# Patient Record
Sex: FEMALE | Race: BLACK OR AFRICAN AMERICAN | NOT HISPANIC OR LATINO | URBAN - METROPOLITAN AREA
[De-identification: names, ages, dates, MRNs, and addresses within clinical notes are randomized per-mention and may not be internally consistent; named-entity substitution may affect disease eponyms.]

---

## 2022-03-02 ENCOUNTER — EMERGENCY (EMERGENCY)
Facility: HOSPITAL | Age: 32
LOS: 1 days | Discharge: HOME | End: 2022-03-02
Attending: EMERGENCY MEDICINE | Admitting: EMERGENCY MEDICINE
Payer: MEDICAID

## 2022-03-02 VITALS
HEART RATE: 68 BPM | TEMPERATURE: 98 F | RESPIRATION RATE: 18 BRPM | DIASTOLIC BLOOD PRESSURE: 88 MMHG | OXYGEN SATURATION: 98 % | SYSTOLIC BLOOD PRESSURE: 141 MMHG

## 2022-03-02 VITALS
RESPIRATION RATE: 18 BRPM | TEMPERATURE: 98 F | HEART RATE: 70 BPM | OXYGEN SATURATION: 98 % | SYSTOLIC BLOOD PRESSURE: 138 MMHG | DIASTOLIC BLOOD PRESSURE: 93 MMHG

## 2022-03-02 LAB
ALBUMIN SERPL ELPH-MCNC: 4 G/DL — SIGNIFICANT CHANGE UP (ref 3.3–5)
ALP SERPL-CCNC: 78 U/L — SIGNIFICANT CHANGE UP (ref 40–120)
ALT FLD-CCNC: 15 U/L — SIGNIFICANT CHANGE UP (ref 4–33)
ANION GAP SERPL CALC-SCNC: 11 MMOL/L — SIGNIFICANT CHANGE UP (ref 7–14)
APPEARANCE UR: CLEAR — SIGNIFICANT CHANGE UP
AST SERPL-CCNC: 16 U/L — SIGNIFICANT CHANGE UP (ref 4–32)
BASOPHILS # BLD AUTO: 0.05 K/UL — SIGNIFICANT CHANGE UP (ref 0–0.2)
BASOPHILS NFR BLD AUTO: 0.6 % — SIGNIFICANT CHANGE UP (ref 0–2)
BILIRUB SERPL-MCNC: 0.2 MG/DL — SIGNIFICANT CHANGE UP (ref 0.2–1.2)
BILIRUB UR-MCNC: NEGATIVE — SIGNIFICANT CHANGE UP
BUN SERPL-MCNC: 11 MG/DL — SIGNIFICANT CHANGE UP (ref 7–23)
CALCIUM SERPL-MCNC: 9 MG/DL — SIGNIFICANT CHANGE UP (ref 8.4–10.5)
CHLORIDE SERPL-SCNC: 104 MMOL/L — SIGNIFICANT CHANGE UP (ref 98–107)
CO2 SERPL-SCNC: 22 MMOL/L — SIGNIFICANT CHANGE UP (ref 22–31)
COLOR SPEC: YELLOW — SIGNIFICANT CHANGE UP
CREAT SERPL-MCNC: 0.79 MG/DL — SIGNIFICANT CHANGE UP (ref 0.5–1.3)
DIFF PNL FLD: NEGATIVE — SIGNIFICANT CHANGE UP
EGFR: 101 ML/MIN/1.73M2 — SIGNIFICANT CHANGE UP
EOSINOPHIL # BLD AUTO: 0.31 K/UL — SIGNIFICANT CHANGE UP (ref 0–0.5)
EOSINOPHIL NFR BLD AUTO: 3.5 % — SIGNIFICANT CHANGE UP (ref 0–6)
GLUCOSE SERPL-MCNC: 99 MG/DL — SIGNIFICANT CHANGE UP (ref 70–99)
GLUCOSE UR QL: NEGATIVE — SIGNIFICANT CHANGE UP
HCG SERPL-ACNC: <5 MIU/ML — SIGNIFICANT CHANGE UP
HCT VFR BLD CALC: 33.6 % — LOW (ref 34.5–45)
HGB BLD-MCNC: 10.5 G/DL — LOW (ref 11.5–15.5)
IANC: 5.46 K/UL — SIGNIFICANT CHANGE UP (ref 1.5–8.5)
IMM GRANULOCYTES NFR BLD AUTO: 0.5 % — SIGNIFICANT CHANGE UP (ref 0–1.5)
KETONES UR-MCNC: NEGATIVE — SIGNIFICANT CHANGE UP
LEUKOCYTE ESTERASE UR-ACNC: NEGATIVE — SIGNIFICANT CHANGE UP
LYMPHOCYTES # BLD AUTO: 2.12 K/UL — SIGNIFICANT CHANGE UP (ref 1–3.3)
LYMPHOCYTES # BLD AUTO: 24.2 % — SIGNIFICANT CHANGE UP (ref 13–44)
MCHC RBC-ENTMCNC: 23.9 PG — LOW (ref 27–34)
MCHC RBC-ENTMCNC: 31.3 GM/DL — LOW (ref 32–36)
MCV RBC AUTO: 76.4 FL — LOW (ref 80–100)
MONOCYTES # BLD AUTO: 0.79 K/UL — SIGNIFICANT CHANGE UP (ref 0–0.9)
MONOCYTES NFR BLD AUTO: 9 % — SIGNIFICANT CHANGE UP (ref 2–14)
NEUTROPHILS # BLD AUTO: 5.46 K/UL — SIGNIFICANT CHANGE UP (ref 1.8–7.4)
NEUTROPHILS NFR BLD AUTO: 62.2 % — SIGNIFICANT CHANGE UP (ref 43–77)
NITRITE UR-MCNC: POSITIVE
NRBC # BLD: 0 /100 WBCS — SIGNIFICANT CHANGE UP
NRBC # FLD: 0 K/UL — SIGNIFICANT CHANGE UP
PH UR: 5.5 — SIGNIFICANT CHANGE UP (ref 5–8)
PLATELET # BLD AUTO: 314 K/UL — SIGNIFICANT CHANGE UP (ref 150–400)
POTASSIUM SERPL-MCNC: 4.2 MMOL/L — SIGNIFICANT CHANGE UP (ref 3.5–5.3)
POTASSIUM SERPL-SCNC: 4.2 MMOL/L — SIGNIFICANT CHANGE UP (ref 3.5–5.3)
PROT SERPL-MCNC: 7.8 G/DL — SIGNIFICANT CHANGE UP (ref 6–8.3)
PROT UR-MCNC: ABNORMAL
RBC # BLD: 4.4 M/UL — SIGNIFICANT CHANGE UP (ref 3.8–5.2)
RBC # FLD: 17.1 % — HIGH (ref 10.3–14.5)
SODIUM SERPL-SCNC: 137 MMOL/L — SIGNIFICANT CHANGE UP (ref 135–145)
SP GR SPEC: 1.03 — SIGNIFICANT CHANGE UP (ref 1–1.05)
UROBILINOGEN FLD QL: SIGNIFICANT CHANGE UP
WBC # BLD: 8.77 K/UL — SIGNIFICANT CHANGE UP (ref 3.8–10.5)
WBC # FLD AUTO: 8.77 K/UL — SIGNIFICANT CHANGE UP (ref 3.8–10.5)

## 2022-03-02 PROCEDURE — 76830 TRANSVAGINAL US NON-OB: CPT | Mod: 26

## 2022-03-02 PROCEDURE — 99285 EMERGENCY DEPT VISIT HI MDM: CPT

## 2022-03-02 RX ORDER — KETOROLAC TROMETHAMINE 30 MG/ML
30 SYRINGE (ML) INJECTION ONCE
Refills: 0 | Status: DISCONTINUED | OUTPATIENT
Start: 2022-03-02 | End: 2022-03-02

## 2022-03-02 RX ORDER — SODIUM CHLORIDE 9 MG/ML
1000 INJECTION INTRAMUSCULAR; INTRAVENOUS; SUBCUTANEOUS ONCE
Refills: 0 | Status: COMPLETED | OUTPATIENT
Start: 2022-03-02 | End: 2022-03-02

## 2022-03-02 RX ORDER — CEPHALEXIN 500 MG
1 CAPSULE ORAL
Qty: 5 | Refills: 0
Start: 2022-03-02

## 2022-03-02 RX ADMIN — SODIUM CHLORIDE 1000 MILLILITER(S): 9 INJECTION INTRAMUSCULAR; INTRAVENOUS; SUBCUTANEOUS at 10:47

## 2022-03-02 RX ADMIN — Medication 30 MILLIGRAM(S): at 10:47

## 2022-03-02 NOTE — ED PROVIDER NOTE - OBJECTIVE STATEMENT
31 yo F with Hx of fibroids here with sharp lower abdominal pain that she woke up this morning a/w a few hours of dysuria. Denies f/c, sob, cp, vomiting, hematuria, vaginal bleeding, vaginal discharge. LMP 2/12. No hx of abdominal surgeries.

## 2022-03-02 NOTE — ED ADULT NURSE NOTE - OBJECTIVE STATEMENT
Patient is a 31 yo female, denies PMH, presenting with lower abdominal pain starting at 4am. Patient AAOx4, no signs of distress, denies nausea, vomiting, fever, chills, shortness of breath, chest pain. 20G PIV placed to LAC, labs sent per orders. Medicated for pain. Fall precautions maintained.

## 2022-03-02 NOTE — ED ADULT NURSE NOTE - NSIMPLEMENTINTERV_GEN_ALL_ED
Implemented All Universal Safety Interventions:  [place] to call system. Call bell, personal items and telephone within reach. Instruct patient to call for assistance. Room bathroom lighting operational. Non-slip footwear when patient is off stretcher. Physically safe environment: no spills, clutter or unnecessary equipment. Stretcher in lowest position, wheels locked, appropriate side rails in place.

## 2022-03-02 NOTE — ED PROVIDER NOTE - PATIENT PORTAL LINK FT
You can access the FollowMyHealth Patient Portal offered by [facility] by registering at the following website: [email or web address]. By joining [employer]’s FollowMyHealth portal, you will also be able to view your health information using other applications (apps) compatible with our system.

## 2022-03-02 NOTE — ED PROVIDER NOTE - CLINICAL SUMMARY MEDICAL DECISION MAKING FREE TEXT BOX
Based on tenderness in the suprapubic region with dysuria, likely UTI so will check labs and ua, uc/s but if neg for infection will performed transvaginal sono, reassess.

## 2022-03-02 NOTE — ED PROVIDER NOTE - NS ED ATTENDING STATEMENT MOD
Attending Only Griseofulvin Pregnancy And Lactation Text: This medication is Pregnancy Category X and is known to cause serious birth defects. It is unknown if this medication is excreted in breast milk but breast feeding should be avoided.

## 2022-03-02 NOTE — ED PROVIDER NOTE - NSFOLLOWUPINSTRUCTIONS_ED_ALL_ED_FT
Advance activity as tolerated.  Continue all previously prescribed medications as directed unless otherwise instructed.  Follow up with your primary care physician in 48-72 hours- bring copies of your results.  Return to the ER for worsening or persistent symptoms, and/or ANY NEW OR CONCERNING SYMPTOMS. If you have issues obtaining follow up, please call: [phone] to obtain a doctor or specialist who takes your insurance in your area.  You may call [phone] to make an appointment with the internal medicine clinic.

## 2022-05-12 ENCOUNTER — EMERGENCY (EMERGENCY)
Facility: HOSPITAL | Age: 32
LOS: 1 days | Discharge: HOME | End: 2022-05-12
Admitting: EMERGENCY MEDICINE
Payer: MEDICAID

## 2022-05-12 VITALS
HEART RATE: 74 BPM | OXYGEN SATURATION: 100 % | RESPIRATION RATE: 16 BRPM | SYSTOLIC BLOOD PRESSURE: 147 MMHG | HEIGHT: 70 IN | DIASTOLIC BLOOD PRESSURE: 89 MMHG | TEMPERATURE: 98 F

## 2022-05-12 LAB
APPEARANCE UR: ABNORMAL
BACTERIA # UR AUTO: NEGATIVE — SIGNIFICANT CHANGE UP
BILIRUB UR-MCNC: NEGATIVE — SIGNIFICANT CHANGE UP
COLOR SPEC: YELLOW — SIGNIFICANT CHANGE UP
DIFF PNL FLD: NEGATIVE — SIGNIFICANT CHANGE UP
EPI CELLS # UR: 5 /HPF — SIGNIFICANT CHANGE UP (ref 0–5)
GLUCOSE UR QL: NEGATIVE — SIGNIFICANT CHANGE UP
HYALINE CASTS # UR AUTO: 1 /LPF — SIGNIFICANT CHANGE UP (ref 0–7)
KETONES UR-MCNC: NEGATIVE — SIGNIFICANT CHANGE UP
LEUKOCYTE ESTERASE UR-ACNC: NEGATIVE — SIGNIFICANT CHANGE UP
NITRITE UR-MCNC: NEGATIVE — SIGNIFICANT CHANGE UP
PH UR: 6 — SIGNIFICANT CHANGE UP (ref 5–8)
PROT UR-MCNC: ABNORMAL
RBC CASTS # UR COMP ASSIST: 1 /HPF — SIGNIFICANT CHANGE UP (ref 0–4)
SP GR SPEC: 1.04 — SIGNIFICANT CHANGE UP (ref 1–1.05)
UROBILINOGEN FLD QL: SIGNIFICANT CHANGE UP
WBC UR QL: 1 /HPF — SIGNIFICANT CHANGE UP (ref 0–5)

## 2022-05-12 PROCEDURE — 99284 EMERGENCY DEPT VISIT MOD MDM: CPT

## 2022-05-12 RX ORDER — CEPHALEXIN 500 MG
1 CAPSULE ORAL
Qty: 28 | Refills: 0
Start: 2022-05-12 | End: 2022-05-18

## 2022-05-12 NOTE — ED PROVIDER NOTE - OBJECTIVE STATEMENT
33 yo F with PMH of fibroids, right ovarian cyst, presents to ER c/o pelvic pain a/w dysuria today. Reports heaviness pain, urinary urgency & hesitancy. Admits similar UTI symptoms 2 months ago. Denies any fever, chills, n/v/d/c, vaginal bleeding, vaginal discharge, hematuria, hx of kidney stones, or any other complaints. 33 yo F with PMH of fibroids, right ovarian cyst, presents to ER c/o suprapubic pain a/w dysuria today. Reports heaviness bladder pain, urinary urgency & hesitancy. Admits similar UTI symptoms 2 months ago. Denies any fever, chills, n/v/d/c, vaginal bleeding, vaginal discharge, hematuria, hx of kidney stones, or any other complaints.

## 2022-05-12 NOTE — ED PROVIDER NOTE - PATIENT PORTAL LINK FT
You can access the FollowMyHealth Patient Portal offered by [facility] by registering at the following website: [email or web address]. By joining [employer]’s FollowMyHealth portal, you will also be able to view your health information using other applications (apps) compatible with our system.

## 2022-05-12 NOTE — ED PROVIDER NOTE - CLINICAL SUMMARY MEDICAL DECISION MAKING FREE TEXT BOX
33 yo F with PMH of fibroids, right ovarian cyst, presents to ER c/o pelvic pain a/w dysuria today. well appearing female. there is no fever. Likely UTI. less likely intra-uterine pathology. Plan: Ua, Ucx, G/chlamydia test and reassess. 33 yo F with PMH of fibroids, right ovarian cyst, presents to ER c/o suprapubic pain a/w dysuria today. well appearing female. there is no fever. Likely UTI. less likely intra-uterine pathology. Plan: Ua, Ucx, G/chlamydia test and reassess.

## 2022-05-12 NOTE — ED PROVIDER NOTE - NSFOLLOWUPINSTRUCTIONS_ED_ALL_ED_FT
Follow up with your PMD within 48-72 hours.  Take Keflex 500 mg 4 times a day for 7 days -- finish this antibiotic.  Increase fluids. Motrin 400mg every 6-8 hours with food for pain. Worsening pain, new fever, chills, nausea, vomiting OR ANY NEW CONCERNING SYMPTOMS return to the Emergency department.

## 2022-05-12 NOTE — ED PROVIDER NOTE - PROGRESS NOTE DETAILS
PA STEFANO: Ua negative. discussed results with patient, pt states her symptoms improved, declined further evaluation with TVUS, agrees to antibiotic treatment and GYN f/u, strict return precautions. Pt is medically stable for discharge and follow up with PMD & GYN. The patient was given verbal and written discharge instructions. Specifically, instructions when to return to the ED and when to seek follow-up from their pcp was discussed. Any specialty follow-up was discussed, including how to make an appointment.  Instructions were discussed in simple, plain language and was understood by the patient. The patient understands that should their symptoms worsen or any new symptoms arise, they should return to the ED immediately for further evaluation. All pt's questions were answered. Patient verbalizes understanding.

## 2022-05-12 NOTE — ED ADULT TRIAGE NOTE - CHIEF COMPLAINT QUOTE
pt c/o pelvic pain with urinary frequency. states was treated for a UTI last month. states took 400mg of motrin pta. denies fever/chills. LMP 4/28

## 2022-05-13 LAB
C TRACH RRNA SPEC QL NAA+PROBE: SIGNIFICANT CHANGE UP
N GONORRHOEA RRNA SPEC QL NAA+PROBE: SIGNIFICANT CHANGE UP
SPECIMEN SOURCE: SIGNIFICANT CHANGE UP

## 2022-05-14 LAB
CULTURE RESULTS: SIGNIFICANT CHANGE UP
SPECIMEN SOURCE: SIGNIFICANT CHANGE UP

## 2022-06-23 ENCOUNTER — EMERGENCY (EMERGENCY)
Facility: HOSPITAL | Age: 32
LOS: 1 days | Discharge: HOME | End: 2022-06-23
Attending: EMERGENCY MEDICINE | Admitting: EMERGENCY MEDICINE
Payer: MEDICAID

## 2022-06-23 VITALS
HEIGHT: 70 IN | SYSTOLIC BLOOD PRESSURE: 143 MMHG | RESPIRATION RATE: 16 BRPM | OXYGEN SATURATION: 100 % | TEMPERATURE: 98 F | HEART RATE: 74 BPM | DIASTOLIC BLOOD PRESSURE: 82 MMHG

## 2022-06-23 PROBLEM — D21.9 BENIGN NEOPLASM OF CONNECTIVE AND OTHER SOFT TISSUE, UNSPECIFIED: Chronic | Status: ACTIVE | Noted: 2022-05-12

## 2022-06-23 PROBLEM — N83.209 UNSPECIFIED OVARIAN CYST, UNSPECIFIED SIDE: Chronic | Status: ACTIVE | Noted: 2022-05-12

## 2022-06-23 PROCEDURE — 99284 EMERGENCY DEPT VISIT MOD MDM: CPT

## 2022-06-23 RX ORDER — ACETAMINOPHEN 500 MG
650 TABLET ORAL ONCE
Refills: 0 | Status: COMPLETED | OUTPATIENT
Start: 2022-06-23 | End: 2022-06-23

## 2022-06-23 RX ADMIN — Medication 650 MILLIGRAM(S): at 11:16

## 2022-06-23 NOTE — ED ADULT TRIAGE NOTE - CHIEF COMPLAINT QUOTE
Pt c/o atraumatic left shoulder pain since Saturday.  Pt states that she went to Urgent Care on Tuesday and was given Naproxen.  Pt states that she was unable to tolerate the medication, still having pain

## 2022-06-23 NOTE — ED PROVIDER NOTE - PROGRESS NOTE DETAILS
Dr. Mcgovern: pt notified that her urine pregnancy test was positive, she is very happy, as she has been trying to get pregnant; pt on prenatal vitamins that I advised her to continue; no abdominal pain or vaginal bleeding to warrant TVUS or further OB workup; LMP 4 weeks ago, and I advised pt to call her OBGYN to schedule a first trimester visit; discussed that in light of pregnancy, acetaminophen is pt's best option for pain control at this time

## 2022-06-23 NOTE — ED PROVIDER NOTE - PATIENT PORTAL LINK FT
You can access the FollowMyHealth Patient Portal offered by [facility] by registering at the following website: [email or web address]. By joining [employer]’s FollowMyHealth portal, you will also be able to view your health information using other applications (apps) compatible with our system.

## 2022-06-23 NOTE — ED PROVIDER NOTE - OBJECTIVE STATEMENT
Dr. Mcgovern: 31 yo right-hand dominant female with asthma (on albuterol PRN), in ED with 5-6 days of atraumatic left shoulder/arm pain.  Pt works as a dance teacher, is very active, but does not remember any falls/injuries/specific movements that preceded pain.  No numbness, tingling or focal weakness.  Pain begins in left shoulder and radiates into left upper arm, sometimes down into left 5th digit.  Pt went to urgent care a few days ago, had reportedly negative upper extremity xray, was discharged with naproxen.  Naproxen has not been helping pain and so pt came to ED.

## 2022-06-23 NOTE — ED PROVIDER NOTE - CLINICAL SUMMARY MEDICAL DECISION MAKING FREE TEXT BOX
left arm pain, atraumatic; no weakness or sensory deficits; exam with spasm and focal TTP, but distribution of symptoms is radicular; consider musculoskeletal pain vs. impingement at neck or brachial plexus; low suspicion for fracture or cord compression; no indication for ED imaging; will treat pain and refer to ortho if symptoms continue

## 2022-06-23 NOTE — ED PROVIDER NOTE - NSFOLLOWUPINSTRUCTIONS_ED_ALL_ED_FT
YOU WERE SEEN IN THE ED FOR LEFT ARM PAIN.  YOU EXAM DOES NOT SHOW SIGNS OF A FRACTURE.  IT IS POSSIBLE THAT YOUR PAIN IS RELATED TO MUSCLE SPASM OR A PINCHED NERVE.  BECAUSE YOU ARE PREGNANT (CONGRATULATIONS!), THE SAFEST OPTION AT THIS POINT IS TYLENOL.  YOU CAN TAKE TYLENOL AS DIRECTED ON THE BOTTLE.    1. YOU CAN ALSO USE ICE OR HEAT TO THE AREA AS DESIRED: APPLY FOR 10 MINUTES ON AND 10 MINUTES OFF, ALTERNATING AS NEEDED.  ALWAYS PUT A TOWEL BETWEEN YOUR SKIN AND THE ICE OR HEAT, TO PROTECT YOUR SKIN FROM FROSTBITE OR BURNS.    2. CALL YOUR OBGYN TO SCHEDULE A ROUTINE FIRST TRIMESTER VISIT.  CONTINUE TO TAKE YOUR PRENATAL VITAMINS.    3. RETURN TO THE ER FOR ANY WORSENING SYMPTOMS OR CONCERNS.

## 2022-06-23 NOTE — ED PROVIDER NOTE - PHYSICAL EXAMINATION
left upper extremity: normal appearing, no deformity; left neck paraspinal cervical muscles with spasm and associated TTP, as well as TTP along left upper extremity laterally; full ROM to entire left UE, including shoulder, elbow, wrist  sensation intact and equal bilateral upper extremities  strength 5/5 both arms and legs proximally and distally  Midline cervical/thoracic/lumbosacral spine without bony TTP or step off.

## 2022-06-29 ENCOUNTER — EMERGENCY (EMERGENCY)
Facility: HOSPITAL | Age: 32
LOS: 1 days | Discharge: HOME | End: 2022-06-29
Attending: EMERGENCY MEDICINE | Admitting: EMERGENCY MEDICINE

## 2022-06-29 VITALS
RESPIRATION RATE: 18 BRPM | OXYGEN SATURATION: 100 % | HEIGHT: 70 IN | HEART RATE: 84 BPM | SYSTOLIC BLOOD PRESSURE: 133 MMHG | DIASTOLIC BLOOD PRESSURE: 71 MMHG | TEMPERATURE: 98 F

## 2022-06-29 LAB
ALBUMIN SERPL ELPH-MCNC: 4.1 G/DL — SIGNIFICANT CHANGE UP (ref 3.3–5)
ALP SERPL-CCNC: 73 U/L — SIGNIFICANT CHANGE UP (ref 40–120)
ALT FLD-CCNC: 19 U/L — SIGNIFICANT CHANGE UP (ref 4–33)
ANION GAP SERPL CALC-SCNC: 13 MMOL/L — SIGNIFICANT CHANGE UP (ref 7–14)
ANISOCYTOSIS BLD QL: SLIGHT — SIGNIFICANT CHANGE UP
APPEARANCE UR: CLEAR — SIGNIFICANT CHANGE UP
AST SERPL-CCNC: 17 U/L — SIGNIFICANT CHANGE UP (ref 4–32)
BACTERIA # UR AUTO: ABNORMAL
BASOPHILS # BLD AUTO: 0 K/UL — SIGNIFICANT CHANGE UP (ref 0–0.2)
BASOPHILS NFR BLD AUTO: 0 % — SIGNIFICANT CHANGE UP (ref 0–2)
BILIRUB SERPL-MCNC: 0.3 MG/DL — SIGNIFICANT CHANGE UP (ref 0.2–1.2)
BILIRUB UR-MCNC: NEGATIVE — SIGNIFICANT CHANGE UP
BLD GP AB SCN SERPL QL: NEGATIVE — SIGNIFICANT CHANGE UP
BUN SERPL-MCNC: 14 MG/DL — SIGNIFICANT CHANGE UP (ref 7–23)
CALCIUM SERPL-MCNC: 9.7 MG/DL — SIGNIFICANT CHANGE UP (ref 8.4–10.5)
CHLORIDE SERPL-SCNC: 101 MMOL/L — SIGNIFICANT CHANGE UP (ref 98–107)
CO2 SERPL-SCNC: 23 MMOL/L — SIGNIFICANT CHANGE UP (ref 22–31)
COLOR SPEC: YELLOW — SIGNIFICANT CHANGE UP
CREAT SERPL-MCNC: 0.71 MG/DL — SIGNIFICANT CHANGE UP (ref 0.5–1.3)
DIFF PNL FLD: ABNORMAL
EGFR: 115 ML/MIN/1.73M2 — SIGNIFICANT CHANGE UP
EOSINOPHIL # BLD AUTO: 0.15 K/UL — SIGNIFICANT CHANGE UP (ref 0–0.5)
EOSINOPHIL NFR BLD AUTO: 0.9 % — SIGNIFICANT CHANGE UP (ref 0–6)
EPI CELLS # UR: 8 /HPF — HIGH (ref 0–5)
GIANT PLATELETS BLD QL SMEAR: PRESENT — SIGNIFICANT CHANGE UP
GLUCOSE SERPL-MCNC: 125 MG/DL — HIGH (ref 70–99)
GLUCOSE UR QL: NEGATIVE — SIGNIFICANT CHANGE UP
HCG SERPL-ACNC: 1308 MIU/ML — SIGNIFICANT CHANGE UP
HCT VFR BLD CALC: 35.6 % — SIGNIFICANT CHANGE UP (ref 34.5–45)
HGB BLD-MCNC: 11.1 G/DL — LOW (ref 11.5–15.5)
HYALINE CASTS # UR AUTO: 2 /LPF — SIGNIFICANT CHANGE UP (ref 0–7)
IANC: 10.29 K/UL — HIGH (ref 1.8–7.4)
KETONES UR-MCNC: NEGATIVE — SIGNIFICANT CHANGE UP
LEUKOCYTE ESTERASE UR-ACNC: ABNORMAL
LYMPHOCYTES # BLD AUTO: 22.8 % — SIGNIFICANT CHANGE UP (ref 13–44)
LYMPHOCYTES # BLD AUTO: 3.68 K/UL — HIGH (ref 1–3.3)
MACROCYTES BLD QL: SLIGHT — SIGNIFICANT CHANGE UP
MCHC RBC-ENTMCNC: 24.3 PG — LOW (ref 27–34)
MCHC RBC-ENTMCNC: 31.2 GM/DL — LOW (ref 32–36)
MCV RBC AUTO: 78.1 FL — LOW (ref 80–100)
MONOCYTES # BLD AUTO: 0.71 K/UL — SIGNIFICANT CHANGE UP (ref 0–0.9)
MONOCYTES NFR BLD AUTO: 4.4 % — SIGNIFICANT CHANGE UP (ref 2–14)
NEUTROPHILS # BLD AUTO: 11.33 K/UL — HIGH (ref 1.8–7.4)
NEUTROPHILS NFR BLD AUTO: 70.2 % — SIGNIFICANT CHANGE UP (ref 43–77)
NITRITE UR-MCNC: NEGATIVE — SIGNIFICANT CHANGE UP
PH UR: 6 — SIGNIFICANT CHANGE UP (ref 5–8)
PLAT MORPH BLD: NORMAL — SIGNIFICANT CHANGE UP
PLATELET # BLD AUTO: 309 K/UL — SIGNIFICANT CHANGE UP (ref 150–400)
PLATELET COUNT - ESTIMATE: NORMAL — SIGNIFICANT CHANGE UP
POTASSIUM SERPL-MCNC: 3.5 MMOL/L — SIGNIFICANT CHANGE UP (ref 3.5–5.3)
POTASSIUM SERPL-SCNC: 3.5 MMOL/L — SIGNIFICANT CHANGE UP (ref 3.5–5.3)
PROT SERPL-MCNC: 7.8 G/DL — SIGNIFICANT CHANGE UP (ref 6–8.3)
PROT UR-MCNC: ABNORMAL
RBC # BLD: 4.56 M/UL — SIGNIFICANT CHANGE UP (ref 3.8–5.2)
RBC # FLD: 20.6 % — HIGH (ref 10.3–14.5)
RBC BLD AUTO: ABNORMAL
RBC CASTS # UR COMP ASSIST: 7 /HPF — HIGH (ref 0–4)
RH IG SCN BLD-IMP: POSITIVE — SIGNIFICANT CHANGE UP
SMUDGE CELLS # BLD: PRESENT — SIGNIFICANT CHANGE UP
SODIUM SERPL-SCNC: 137 MMOL/L — SIGNIFICANT CHANGE UP (ref 135–145)
SP GR SPEC: 1.03 — SIGNIFICANT CHANGE UP (ref 1–1.05)
UROBILINOGEN FLD QL: SIGNIFICANT CHANGE UP
VARIANT LYMPHS # BLD: 1.7 % — SIGNIFICANT CHANGE UP (ref 0–6)
WBC # BLD: 16.14 K/UL — HIGH (ref 3.8–10.5)
WBC # FLD AUTO: 16.14 K/UL — HIGH (ref 3.8–10.5)
WBC UR QL: 7 /HPF — HIGH (ref 0–5)

## 2022-06-29 PROCEDURE — 99285 EMERGENCY DEPT VISIT HI MDM: CPT

## 2022-06-29 RX ORDER — ACETAMINOPHEN 500 MG
650 TABLET ORAL ONCE
Refills: 0 | Status: COMPLETED | OUTPATIENT
Start: 2022-06-29 | End: 2022-06-29

## 2022-06-29 RX ADMIN — Medication 650 MILLIGRAM(S): at 22:34

## 2022-06-29 NOTE — ED PROVIDER NOTE - CLINICAL SUMMARY MEDICAL DECISION MAKING FREE TEXT BOX
Luana Asencio MD, PGY-3: 31YO F hx asthma, p/w mechanical fall onto R buttock hours prior and abdominal cramping in setting of pregnancy. vss, pe no abd ttp, + mild TTP R buttock. consider ectopic pregnancy given pain. low suspicion fracture, cauda equina s/p mechanical fall given benign PE findings. plan for basic labs, hcg, ua, tvus.

## 2022-06-29 NOTE — ED PROVIDER NOTE - PATIENT PORTAL LINK FT
You can access the FollowMyHealth Patient Portal offered by [facility] by registering at the following website: [email or web address]. By joining [employer]’s FollowMyHealth portal, you will also be able to view your health information using other applications (apps) compatible with our system.

## 2022-06-29 NOTE — ED PROVIDER NOTE - OBJECTIVE STATEMENT
31YO F hx asthma, p/w mechanical fall onto R buttock hours prior. no LOC, no head/neck trauma or pain. denies back/flank pain, saddle anesthesia, b/b incontinence or retention. pt ambulatory s/p event w/o issues. also endorses multiple positive pregnancy tests 1d prior, LMP 5/24, a/w pelvic cramping (x 4 days) and increased urinary frequency. denies dysuria, vaginal discharge or bleeding.

## 2022-06-29 NOTE — ED PROVIDER NOTE - ATTENDING CONTRIBUTION TO CARE
I performed a face-to-face evaluation of the patient and performed a history and physical examination along with the resident or ACP, and/or medical student above.  I agree with the history and physical examination as documented by the resident or ACP, and/or medical student above.  [place]:   33yo F w/ pmh asthma and recent +home and hospital pregnancy test (LMP 5/24), presents for eval of R buttock pain s/p mechanical fall, as well as confirmation of IUP. Endorses pelvic cramping x 4 days and urinary frequency, but denies vaginal bleeding, f/c/dysuria. Labs, ua, tvus, reassess.

## 2022-06-29 NOTE — ED PROVIDER NOTE - PHYSICAL EXAMINATION
Gen: WDWN, NAD  HEENT: EOMI, no nasal discharge, mucous membranes moist, no scalp hematoma  CV: RRR, 2+ radial pulses b/l  Resp: no accessory muscle use, no increased work of breathing  GI: Abdomen soft non-distended, NTTP  MSK: No open wounds, no bruising, no LE edema, no midline spinal ttp, + R buttock ttp  Neuro: A&Ox4, following commands, moving all four extremities spontaneously  Psych: appropriate mood

## 2022-06-29 NOTE — ED PROVIDER NOTE - PROGRESS NOTE DETAILS
Luana Asencio MD, PGY-3: labs consistent with UTI, will give abs here and send script. TVUS showing single intrauterine pregnancy. pt denies complaints. will dc w/ ob f/u.

## 2022-06-29 NOTE — ED PROVIDER NOTE - NSFOLLOWUPINSTRUCTIONS_ED_ALL_ED_FT
--take tylenol as needed for pain. Take tylenol 1000mg every 6 hours  --today, the lab tests we did include basic blood and urine studies, the imaging tests we did include transvaginal ultrasound. results significant for UTI; single intrauterine pregnancy (no ectopic pregnancy). we have included these test results in your paperwork. please take them to your follow-up appointment  --given that you were in the ED today, we recommend a followup visit with your OB doctor within 7 days.   --your diagnosis is: abdominal pain in pregnancy  --return to the ED if your current symptoms worsen, if fevers, flank pain, vaginal bleeding.  --we sent medications to your pharmacy, take as prescribed. (note - these antibiotics are safe in pregnancy)

## 2022-06-29 NOTE — ED PROVIDER NOTE - NS ED ROS FT
Gen: Denies fevers  CV: Denies chest pain  Resp: Denies SOB  GI: + abdominal pain  Msk: + R buttock pain  : + increased urinary frequency.   Neuro: Denies numbness, paresthesias, weakness to arms/legs

## 2022-06-29 NOTE — ED ADULT TRIAGE NOTE - CHIEF COMPLAINT QUOTE
Pt c/o rt sided buttock pain and abd cramping s/p trip and fall. Reports recently told + pregnancy test. LMP 5/24. Denies vaginal bleeding, PMHX. Pt c/o rt sided buttock/leg pain and abd cramping s/p trip and fall. Reports recently told + pregnancy test. LMP 5/24. Denies vaginal bleeding, PMHX Asthma.

## 2022-06-30 VITALS
HEART RATE: 78 BPM | SYSTOLIC BLOOD PRESSURE: 152 MMHG | DIASTOLIC BLOOD PRESSURE: 74 MMHG | TEMPERATURE: 98 F | OXYGEN SATURATION: 100 % | RESPIRATION RATE: 17 BRPM

## 2022-06-30 PROCEDURE — 76817 TRANSVAGINAL US OBSTETRIC: CPT | Mod: 26

## 2022-06-30 RX ORDER — CEPHALEXIN 500 MG
1 CAPSULE ORAL
Qty: 28 | Refills: 0
Start: 2022-06-30 | End: 2022-07-06

## 2022-06-30 RX ORDER — CEFPODOXIME PROXETIL 100 MG
100 TABLET ORAL ONCE
Refills: 0 | Status: DISCONTINUED | OUTPATIENT
Start: 2022-06-30 | End: 2022-06-30

## 2022-06-30 RX ORDER — CEPHALEXIN 500 MG
500 CAPSULE ORAL ONCE
Refills: 0 | Status: COMPLETED | OUTPATIENT
Start: 2022-06-30 | End: 2022-06-30

## 2022-06-30 RX ADMIN — Medication 500 MILLIGRAM(S): at 00:17

## 2022-06-30 NOTE — ED ADULT NURSE NOTE - NSFALLRSKHARMRISK_ED_ALL_ED
no
You can access the FollowMyHealth Patient Portal offered by [facility] by registering at the following website: [email or web address]. By joining [employer]’s FollowMyHealth portal, you will also be able to view your health information using other applications (apps) compatible with our system.

## 2022-06-30 NOTE — ED ADULT NURSE NOTE - CHIEF COMPLAINT QUOTE
Pt c/o rt sided buttock/leg pain and abd cramping s/p trip and fall. Reports recently told + pregnancy test. LMP 5/24. Denies vaginal bleeding, PMHX Asthma.

## 2022-06-30 NOTE — ED ADULT NURSE NOTE - OBJECTIVE STATEMENT
pt received to rm 15 , a&ox4 , ambulatory , pmh of asthma , p/w mechanical fall on to buttock no LOC , no head trauma  . Pt breathing even and unlabored on room air. pt had a postivie pregnancy test w/ LMP on 5/24 . Denies fever, chills, cough, SOB, chest pain, palpitations, dizziness, N/V/D, constipation, numbness, tingling. IV placed. Labs collected and sent.  pt educated on fall precautions and confirms understanding via teach back method. Stretcher locked in lowest position with siderails up x2. Call bell and personal items within reach.

## 2022-07-01 LAB
CULTURE RESULTS: SIGNIFICANT CHANGE UP
SPECIMEN SOURCE: SIGNIFICANT CHANGE UP

## 2022-07-06 PROBLEM — Z00.00 ENCOUNTER FOR PREVENTIVE HEALTH EXAMINATION: Status: ACTIVE | Noted: 2022-07-06

## 2022-07-07 ENCOUNTER — ASOB RESULT (OUTPATIENT)
Age: 32
End: 2022-07-07

## 2022-07-07 ENCOUNTER — APPOINTMENT (OUTPATIENT)
Dept: OBGYN | Facility: CLINIC | Age: 32
End: 2022-07-07

## 2022-07-07 VITALS
OXYGEN SATURATION: 100 % | WEIGHT: 243 LBS | HEART RATE: 74 BPM | SYSTOLIC BLOOD PRESSURE: 154 MMHG | RESPIRATION RATE: 17 BRPM | DIASTOLIC BLOOD PRESSURE: 92 MMHG

## 2022-07-07 DIAGNOSIS — O36.80X0 PREGNANCY WITH INCONCLUSIVE FETAL VIABILITY, NOT APPLICABLE OR UNSPECIFIED: ICD-10-CM

## 2022-07-07 DIAGNOSIS — Z78.9 OTHER SPECIFIED HEALTH STATUS: ICD-10-CM

## 2022-07-07 PROCEDURE — 76817 TRANSVAGINAL US OBSTETRIC: CPT

## 2022-07-07 PROCEDURE — 99203 OFFICE O/P NEW LOW 30 MIN: CPT

## 2022-07-08 LAB
ABO + RH PNL BLD: NORMAL
BLD GP AB SCN SERPL QL: NORMAL
C TRACH RRNA SPEC QL NAA+PROBE: NOT DETECTED
N GONORRHOEA RRNA SPEC QL NAA+PROBE: NOT DETECTED
SOURCE AMPLIFICATION: NORMAL

## 2022-07-09 ENCOUNTER — EMERGENCY (EMERGENCY)
Facility: HOSPITAL | Age: 32
LOS: 1 days | Discharge: HOME | End: 2022-07-09
Attending: EMERGENCY MEDICINE | Admitting: EMERGENCY MEDICINE

## 2022-07-09 VITALS
HEIGHT: 70 IN | RESPIRATION RATE: 18 BRPM | HEART RATE: 79 BPM | DIASTOLIC BLOOD PRESSURE: 59 MMHG | TEMPERATURE: 98 F | SYSTOLIC BLOOD PRESSURE: 125 MMHG | OXYGEN SATURATION: 100 %

## 2022-07-09 VITALS
TEMPERATURE: 96 F | RESPIRATION RATE: 16 BRPM | DIASTOLIC BLOOD PRESSURE: 57 MMHG | HEART RATE: 73 BPM | OXYGEN SATURATION: 100 % | SYSTOLIC BLOOD PRESSURE: 147 MMHG

## 2022-07-09 LAB
ALBUMIN SERPL ELPH-MCNC: 3.7 G/DL — SIGNIFICANT CHANGE UP (ref 3.3–5)
ALP SERPL-CCNC: 69 U/L — SIGNIFICANT CHANGE UP (ref 40–120)
ALT FLD-CCNC: 20 U/L — SIGNIFICANT CHANGE UP (ref 4–33)
ANION GAP SERPL CALC-SCNC: 11 MMOL/L — SIGNIFICANT CHANGE UP (ref 7–14)
APPEARANCE UR: ABNORMAL
APTT BLD: 31.4 SEC — SIGNIFICANT CHANGE UP (ref 27–36.3)
AST SERPL-CCNC: 16 U/L — SIGNIFICANT CHANGE UP (ref 4–32)
BACTERIA # UR AUTO: ABNORMAL
BACTERIA UR CULT: NORMAL
BASOPHILS # BLD AUTO: 0.05 K/UL — SIGNIFICANT CHANGE UP (ref 0–0.2)
BASOPHILS NFR BLD AUTO: 0.6 % — SIGNIFICANT CHANGE UP (ref 0–2)
BILIRUB SERPL-MCNC: 0.3 MG/DL — SIGNIFICANT CHANGE UP (ref 0.2–1.2)
BILIRUB UR-MCNC: NEGATIVE — SIGNIFICANT CHANGE UP
BLD GP AB SCN SERPL QL: NEGATIVE — SIGNIFICANT CHANGE UP
BUN SERPL-MCNC: 12 MG/DL — SIGNIFICANT CHANGE UP (ref 7–23)
CALCIUM SERPL-MCNC: 8.9 MG/DL — SIGNIFICANT CHANGE UP (ref 8.4–10.5)
CHLORIDE SERPL-SCNC: 103 MMOL/L — SIGNIFICANT CHANGE UP (ref 98–107)
CO2 SERPL-SCNC: 23 MMOL/L — SIGNIFICANT CHANGE UP (ref 22–31)
COLOR SPEC: YELLOW — SIGNIFICANT CHANGE UP
CREAT SERPL-MCNC: 0.76 MG/DL — SIGNIFICANT CHANGE UP (ref 0.5–1.3)
DIFF PNL FLD: ABNORMAL
EGFR: 106 ML/MIN/1.73M2 — SIGNIFICANT CHANGE UP
EOSINOPHIL # BLD AUTO: 0.18 K/UL — SIGNIFICANT CHANGE UP (ref 0–0.5)
EOSINOPHIL NFR BLD AUTO: 2.1 % — SIGNIFICANT CHANGE UP (ref 0–6)
EPI CELLS # UR: 21 /HPF — HIGH (ref 0–5)
GLUCOSE SERPL-MCNC: 120 MG/DL — HIGH (ref 70–99)
GLUCOSE UR QL: NEGATIVE — SIGNIFICANT CHANGE UP
HCG SERPL-ACNC: SIGNIFICANT CHANGE UP MIU/ML
HCT VFR BLD CALC: 37.6 % — SIGNIFICANT CHANGE UP (ref 34.5–45)
HGB BLD-MCNC: 11.7 G/DL — SIGNIFICANT CHANGE UP (ref 11.5–15.5)
HYALINE CASTS # UR AUTO: 1 /LPF — SIGNIFICANT CHANGE UP (ref 0–7)
IANC: 5.69 K/UL — SIGNIFICANT CHANGE UP (ref 1.8–7.4)
IMM GRANULOCYTES NFR BLD AUTO: 0.7 % — SIGNIFICANT CHANGE UP (ref 0–1.5)
INR BLD: 1.09 RATIO — SIGNIFICANT CHANGE UP (ref 0.88–1.16)
KETONES UR-MCNC: ABNORMAL
LEUKOCYTE ESTERASE UR-ACNC: ABNORMAL
LYMPHOCYTES # BLD AUTO: 1.94 K/UL — SIGNIFICANT CHANGE UP (ref 1–3.3)
LYMPHOCYTES # BLD AUTO: 22.4 % — SIGNIFICANT CHANGE UP (ref 13–44)
MCHC RBC-ENTMCNC: 24.8 PG — LOW (ref 27–34)
MCHC RBC-ENTMCNC: 31.1 GM/DL — LOW (ref 32–36)
MCV RBC AUTO: 79.7 FL — LOW (ref 80–100)
MONOCYTES # BLD AUTO: 0.76 K/UL — SIGNIFICANT CHANGE UP (ref 0–0.9)
MONOCYTES NFR BLD AUTO: 8.8 % — SIGNIFICANT CHANGE UP (ref 2–14)
NEUTROPHILS # BLD AUTO: 5.69 K/UL — SIGNIFICANT CHANGE UP (ref 1.8–7.4)
NEUTROPHILS NFR BLD AUTO: 65.4 % — SIGNIFICANT CHANGE UP (ref 43–77)
NITRITE UR-MCNC: NEGATIVE — SIGNIFICANT CHANGE UP
NRBC # BLD: 0 /100 WBCS — SIGNIFICANT CHANGE UP
NRBC # FLD: 0 K/UL — SIGNIFICANT CHANGE UP
PH UR: 6 — SIGNIFICANT CHANGE UP (ref 5–8)
PLATELET # BLD AUTO: 249 K/UL — SIGNIFICANT CHANGE UP (ref 150–400)
POTASSIUM SERPL-MCNC: 4 MMOL/L — SIGNIFICANT CHANGE UP (ref 3.5–5.3)
POTASSIUM SERPL-SCNC: 4 MMOL/L — SIGNIFICANT CHANGE UP (ref 3.5–5.3)
PROT SERPL-MCNC: 7.4 G/DL — SIGNIFICANT CHANGE UP (ref 6–8.3)
PROT UR-MCNC: ABNORMAL
PROTHROM AB SERPL-ACNC: 12.6 SEC — SIGNIFICANT CHANGE UP (ref 10.5–13.4)
RBC # BLD: 4.72 M/UL — SIGNIFICANT CHANGE UP (ref 3.8–5.2)
RBC # FLD: 21.2 % — HIGH (ref 10.3–14.5)
RBC CASTS # UR COMP ASSIST: 3 /HPF — SIGNIFICANT CHANGE UP (ref 0–4)
RH IG SCN BLD-IMP: POSITIVE — SIGNIFICANT CHANGE UP
SODIUM SERPL-SCNC: 137 MMOL/L — SIGNIFICANT CHANGE UP (ref 135–145)
SP GR SPEC: 1.02 — SIGNIFICANT CHANGE UP (ref 1–1.05)
UROBILINOGEN FLD QL: SIGNIFICANT CHANGE UP
WBC # BLD: 8.68 K/UL — SIGNIFICANT CHANGE UP (ref 3.8–10.5)
WBC # FLD AUTO: 8.68 K/UL — SIGNIFICANT CHANGE UP (ref 3.8–10.5)
WBC UR QL: 23 /HPF — HIGH (ref 0–5)

## 2022-07-09 PROCEDURE — 76830 TRANSVAGINAL US NON-OB: CPT | Mod: 26

## 2022-07-09 PROCEDURE — 99285 EMERGENCY DEPT VISIT HI MDM: CPT

## 2022-07-09 NOTE — ED PROVIDER NOTE - NSFOLLOWUPINSTRUCTIONS_ED_ALL_ED_FT
A hematoma is a collection of blood outside of the blood vessels. A subchorionic hematoma is a collection of blood between the outer wall of the embryo (chorion) and the inner wall of the uterus.    This condition can cause vaginal bleeding. Early small hematomas usually shrink on their own and do not affect your baby or pregnancy. When bleeding starts later in pregnancy, or if the hematoma is larger or occurs in older pregnant women, the condition may be more serious. Larger hematomas increase the chances of miscarriage. This condition also increases the risk of:  •Premature separation of the placenta from the uterus.      •Premature () labor.      •Stillbirth.        What are the causes?    The exact cause of this condition is not known. It occurs when blood is trapped between the placenta and the uterine wall because the placenta has  from the original site of implantation.      What increases the risk?    You are more likely to develop this condition if:  •You were treated with fertility medicines.      •You became pregnant through in vitro fertilization (IVF).        What are the signs or symptoms?    Symptoms of this condition include:  •Vaginal spotting or bleeding.      •Abdominal pain. This is rare.      Sometimes you may have no symptoms and the bleeding may only be seen when ultrasound images are taken (transvaginal ultrasound).      How is this diagnosed?    This condition is diagnosed based on a physical exam. This includes a pelvic exam. You may also have other tests, including:  •Blood tests.      •Urine tests.      •Ultrasound of the abdomen.        How is this treated?    Treatment for this condition can vary. Treatment may include:  •Watchful waiting. You will be monitored closely for any changes in bleeding.      •Medicines.      •Activity restriction. This may be needed until the bleeding stops.      •A medicine called Rh immunoglobulin. This is given if you have an Rh-negative blood type. It prevents Rh sensitization.        Follow these instructions at home:    •Stay on bed rest if told to do so by your health care provider.      • Do not lift anything that is heavier than 10 lb (4.5 kg), or the limit that you are told by your health care provider.      •Track and write down the number of pads you use each day and how soaked (saturated) they are.      • Do not use tampons.      •Keep all follow-up visits. This is important. Your health care provider may ask you to have follow-up blood tests or ultrasound tests or both.        Contact a health care provider if:    •You have any vaginal bleeding.      •You have a fever.        Get help right away if:    •You have severe cramps in your stomach, back, abdomen, or pelvis.      •You pass large clots or tissue. Save any tissue for your health care provider to look at.      •You faint.      •You become light-headed or weak.

## 2022-07-09 NOTE — ED PROVIDER NOTE - PATIENT PORTAL LINK FT
You can access the FollowMyHealth Patient Portal offered by [facility] by registering at the following website: [email or web address]. By joining [email or web address]’s FollowMyHealth portal, you will also be able to view your health information using other applications (apps) compatible with our system.

## 2022-07-09 NOTE — ED PROVIDER NOTE - NS ED ROS FT
Gen: Denies fever  CV: Denies palpitations  Skin: Denies rash, erythema, color changes  Resp: Denies cough  GI: Denies diarrhea, constipation, vomiting  Msk: Denies back pain, LE swelling  : Denies dysuria, increased frequency  Neuro: Denies LOC

## 2022-07-09 NOTE — ED PROVIDER NOTE - OBJECTIVE STATEMENT
31 y/o  female with pmhx of asthma presenting with vaginal bleeding. LMP  and transvaginal u/s performed on Thursday that showed IUP/FHR. This morning after using bathroom noticed dark blood on toilet paper. No bleeding since. No vaginal discharge/odors, fevers/chills, vomiting/diarrhea, cough, rashes, dysuria/hematuria. Mild abdominal cramping.

## 2022-07-09 NOTE — ED ADULT NURSE NOTE - OBJECTIVE STATEMENT
pt A&ox4, came to ED for vaginal bleeding that started today. pt states she is 6 weeks pregnant and started noticing clots this morning. abdomen is soft, round, and nontender. pt c/o abdominal cramping. pt denies Chest pain and SOB. breathing is spontaneous and unlabored. sating 99% on RA. bilateral pedal and radial pulses palpable and strong. right ac 20g IV placed Labs drawn and sent as per ordered. Bed in lowest position, call bell within reach, all other safety and comfort measures provided. awaiting labs results and further orders.

## 2022-07-09 NOTE — ED PROVIDER NOTE - CLINICAL SUMMARY MEDICAL DECISION MAKING FREE TEXT BOX
31 y/o  female with pmhx of asthma presenting with vaginal bleeding, LMP  and transvaginal u/s performed on Thursday that showed IUP/FHR, trace blood in vaginal vault, well appearing, abdomen NTTP; TVUS, labs, type/screen to r/o  vs. subchorionic hematoma vs. ectopic 31 y/o  female with pmhx of asthma presenting with vaginal bleeding, LMP  and transvaginal u/s performed on Thursday that showed IUP/FHR, trace blood in vaginal vault, well appearing, abdomen NTTP; TVUS, labs, type/screen to r/o  vs. subchorionic hematoma vs. ectopic  Susan: 31yo known IUP with vaginal bleeding. Will check US and labs and ua. if os closed and bleeding ok expect gyn fu.

## 2022-07-09 NOTE — ED PROVIDER NOTE - PHYSICAL EXAMINATION
Gen: Elevated BMI, NAD, comfortable appearing   HEENT: EOMI, no nasal discharge, mucous membranes moist  CV: RRR, +S1/S2, no M/R/G, equal b/l radial pulses 2+  Resp: CTAB, no W/R/R, no increased WOB   GI: Abdomen soft non-distended, NTTP, no masses/organomegaly   : Chaperone (She TORRES); Trace blood in vaginal vault, cervix closed, no CMT or adnexal TTP b/l   MSK/Skin: No CVA tenderness, no open wounds, no bruising, no LE edema  Neuro: A&Ox4, moving all 4 extremities spontaneously, gross sensation intact in UE and LE BL  Psych: appropriate mood

## 2022-07-09 NOTE — ED PROVIDER NOTE - PROGRESS NOTE DETAILS
Ishan, PGY-3  Small subchorionic hematoma and IUP with FHR on TVUS. RH positive. UA shows bacteria but multiple epithelial cells; asymptomatic. Per ACOG guidelines will wait for culture (sent) to treat. Will d/c with return precautions

## 2022-07-11 LAB
CULTURE RESULTS: SIGNIFICANT CHANGE UP
SPECIMEN SOURCE: SIGNIFICANT CHANGE UP

## 2022-07-19 ENCOUNTER — APPOINTMENT (OUTPATIENT)
Dept: OBGYN | Facility: CLINIC | Age: 32
End: 2022-07-19

## 2022-07-19 ENCOUNTER — ASOB RESULT (OUTPATIENT)
Age: 32
End: 2022-07-19

## 2022-07-19 VITALS
DIASTOLIC BLOOD PRESSURE: 86 MMHG | BODY MASS INDEX: 35.84 KG/M2 | SYSTOLIC BLOOD PRESSURE: 137 MMHG | HEIGHT: 69 IN | WEIGHT: 242 LBS | HEART RATE: 73 BPM

## 2022-07-19 DIAGNOSIS — O21.9 VOMITING OF PREGNANCY, UNSPECIFIED: ICD-10-CM

## 2022-07-19 DIAGNOSIS — Z34.91 ENCOUNTER FOR SUPERVISION OF NORMAL PREGNANCY, UNSPECIFIED, FIRST TRIMESTER: ICD-10-CM

## 2022-07-19 DIAGNOSIS — O99.519 DISEASES OF THE RESPIRATORY SYSTEM COMPLICATING PREGNANCY, UNSPECIFIED TRIMESTER: ICD-10-CM

## 2022-07-19 DIAGNOSIS — J45.909 DISEASES OF THE RESPIRATORY SYSTEM COMPLICATING PREGNANCY, UNSPECIFIED TRIMESTER: ICD-10-CM

## 2022-07-19 DIAGNOSIS — R09.89 OTHER SPECIFIED SYMPTOMS AND SIGNS INVOLVING THE CIRCULATORY AND RESPIRATORY SYSTEMS: ICD-10-CM

## 2022-07-19 LAB
ALBUMIN SERPL ELPH-MCNC: 4 G/DL
ALP BLD-CCNC: 72 U/L
ALT SERPL-CCNC: 18 U/L
ANION GAP SERPL CALC-SCNC: 12 MMOL/L
AST SERPL-CCNC: 15 U/L
BASOPHILS # BLD AUTO: 0.06 K/UL
BASOPHILS NFR BLD AUTO: 0.9 %
BILIRUB SERPL-MCNC: 0.2 MG/DL
BUN SERPL-MCNC: 11 MG/DL
CALCIUM SERPL-MCNC: 9.7 MG/DL
CHLORIDE SERPL-SCNC: 101 MMOL/L
CO2 SERPL-SCNC: 24 MMOL/L
CREAT SERPL-MCNC: 0.69 MG/DL
EGFR: 117 ML/MIN/1.73M2
EOSINOPHIL # BLD AUTO: 0.15 K/UL
EOSINOPHIL NFR BLD AUTO: 2.3 %
ESTIMATED AVERAGE GLUCOSE: 123 MG/DL
GLUCOSE SERPL-MCNC: 116 MG/DL
HBA1C MFR BLD HPLC: 5.9 %
HBV SURFACE AG SER QL: NONREACTIVE
HCT VFR BLD CALC: 42.7 %
HCV AB SER QL: NONREACTIVE
HCV S/CO RATIO: 0.34 S/CO
HGB BLD-MCNC: 13.1 G/DL
HIV1+2 AB SPEC QL IA.RAPID: NONREACTIVE
IMM GRANULOCYTES NFR BLD AUTO: 0.9 %
LYMPHOCYTES # BLD AUTO: 1.93 K/UL
LYMPHOCYTES NFR BLD AUTO: 29 %
MAN DIFF?: NORMAL
MCHC RBC-ENTMCNC: 25.2 PG
MCHC RBC-ENTMCNC: 30.7 GM/DL
MCV RBC AUTO: 82.3 FL
MONOCYTES # BLD AUTO: 0.73 K/UL
MONOCYTES NFR BLD AUTO: 11 %
NEUTROPHILS # BLD AUTO: 3.73 K/UL
NEUTROPHILS NFR BLD AUTO: 55.9 %
PLATELET # BLD AUTO: 254 K/UL
POTASSIUM SERPL-SCNC: 4.3 MMOL/L
PROT SERPL-MCNC: 7.6 G/DL
RBC # BLD: 5.19 M/UL
RBC # FLD: 21.8 %
SODIUM SERPL-SCNC: 137 MMOL/L
TSH SERPL-ACNC: 0.95 UIU/ML
WBC # FLD AUTO: 6.66 K/UL

## 2022-07-19 PROCEDURE — 76817 TRANSVAGINAL US OBSTETRIC: CPT

## 2022-07-19 PROCEDURE — 0500F INITIAL PRENATAL CARE VISIT: CPT

## 2022-07-19 RX ORDER — PYRIDOXINE HCL (VITAMIN B6) 25 MG
25 TABLET ORAL
Qty: 90 | Refills: 0 | Status: ACTIVE | COMMUNITY
Start: 2022-07-19 | End: 1900-01-01

## 2022-07-19 RX ORDER — BLOOD-GLUCOSE METER
KIT MISCELLANEOUS
Qty: 1 | Refills: 0 | Status: ACTIVE | COMMUNITY
Start: 2022-07-19 | End: 1900-01-01

## 2022-07-21 PROBLEM — O99.519 ASTHMA DURING PREGNANCY: Status: ACTIVE | Noted: 2022-07-07

## 2022-07-21 LAB
ABO + RH PNL BLD: NORMAL
BLD GP AB SCN SERPL QL: NORMAL
CMV IGG SERPL QL: <0.2 U/ML
CMV IGG SERPL-IMP: NEGATIVE
CMV IGM SERPL QL: <8 AU/ML
CMV IGM SERPL QL: NEGATIVE
LEAD BLD-MCNC: <1 UG/DL
MEV IGG FLD QL IA: 33.9 AU/ML
MEV IGG+IGM SER-IMP: POSITIVE
RUBV IGG FLD-ACNC: 7.2 INDEX
RUBV IGG SER-IMP: POSITIVE
RUBV IGM FLD-ACNC: <20 AU/ML
T GONDII AB SER-IMP: NEGATIVE
T GONDII AB SER-IMP: NEGATIVE
T GONDII IGG SER QL: <3 IU/ML
T GONDII IGM SER QL: <3 AU/ML
T PALLIDUM AB SER QL IA: NEGATIVE
VZV AB TITR SER: POSITIVE
VZV IGG SER IF-ACNC: 1816 INDEX

## 2022-07-22 LAB
B19V IGG SER QL IA: 4.39 INDEX
B19V IGG+IGM SER-IMP: NORMAL
B19V IGG+IGM SER-IMP: POSITIVE
B19V IGM FLD-ACNC: 0.16 INDEX
B19V IGM SER-ACNC: NEGATIVE

## 2022-07-23 LAB — MEV IGM SER QL: <0.91 ISR

## 2022-07-30 ENCOUNTER — NON-APPOINTMENT (OUTPATIENT)
Age: 32
End: 2022-07-30

## 2022-08-10 ENCOUNTER — NON-APPOINTMENT (OUTPATIENT)
Age: 32
End: 2022-08-10

## 2022-08-11 ENCOUNTER — NON-APPOINTMENT (OUTPATIENT)
Age: 32
End: 2022-08-11

## 2022-08-11 ENCOUNTER — APPOINTMENT (OUTPATIENT)
Dept: OBGYN | Facility: CLINIC | Age: 32
End: 2022-08-11

## 2022-08-11 ENCOUNTER — APPOINTMENT (OUTPATIENT)
Dept: ANTEPARTUM | Facility: CLINIC | Age: 32
End: 2022-08-11

## 2022-08-11 ENCOUNTER — ASOB RESULT (OUTPATIENT)
Age: 32
End: 2022-08-11

## 2022-08-11 VITALS
SYSTOLIC BLOOD PRESSURE: 138 MMHG | DIASTOLIC BLOOD PRESSURE: 83 MMHG | HEIGHT: 69 IN | BODY MASS INDEX: 35.84 KG/M2 | WEIGHT: 242 LBS

## 2022-08-11 PROCEDURE — 0502F SUBSEQUENT PRENATAL CARE: CPT

## 2022-08-11 PROCEDURE — 76801 OB US < 14 WKS SINGLE FETUS: CPT

## 2022-08-22 ENCOUNTER — EMERGENCY (EMERGENCY)
Facility: HOSPITAL | Age: 32
LOS: 1 days | Discharge: HOME | End: 2022-08-22
Attending: EMERGENCY MEDICINE | Admitting: EMERGENCY MEDICINE

## 2022-08-22 ENCOUNTER — NON-APPOINTMENT (OUTPATIENT)
Age: 32
End: 2022-08-22

## 2022-08-22 VITALS
RESPIRATION RATE: 18 BRPM | SYSTOLIC BLOOD PRESSURE: 110 MMHG | HEIGHT: 70 IN | TEMPERATURE: 98 F | HEART RATE: 74 BPM | OXYGEN SATURATION: 100 % | DIASTOLIC BLOOD PRESSURE: 60 MMHG

## 2022-08-22 LAB
ALBUMIN SERPL ELPH-MCNC: 4.1 G/DL — SIGNIFICANT CHANGE UP (ref 3.3–5)
ALP SERPL-CCNC: 55 U/L — SIGNIFICANT CHANGE UP (ref 40–120)
ALT FLD-CCNC: 18 U/L — SIGNIFICANT CHANGE UP (ref 4–33)
ANION GAP SERPL CALC-SCNC: 12 MMOL/L — SIGNIFICANT CHANGE UP (ref 7–14)
ANISOCYTOSIS BLD QL: SLIGHT — SIGNIFICANT CHANGE UP
APPEARANCE UR: CLEAR — SIGNIFICANT CHANGE UP
AST SERPL-CCNC: 16 U/L — SIGNIFICANT CHANGE UP (ref 4–32)
BACTERIA # UR AUTO: NEGATIVE — SIGNIFICANT CHANGE UP
BASOPHILS # BLD AUTO: 0.04 K/UL — SIGNIFICANT CHANGE UP (ref 0–0.2)
BASOPHILS NFR BLD AUTO: 0.9 % — SIGNIFICANT CHANGE UP (ref 0–2)
BILIRUB SERPL-MCNC: 0.2 MG/DL — SIGNIFICANT CHANGE UP (ref 0.2–1.2)
BILIRUB UR-MCNC: NEGATIVE — SIGNIFICANT CHANGE UP
BUN SERPL-MCNC: 7 MG/DL — SIGNIFICANT CHANGE UP (ref 7–23)
CALCIUM SERPL-MCNC: 10 MG/DL — SIGNIFICANT CHANGE UP (ref 8.4–10.5)
CHLORIDE SERPL-SCNC: 99 MMOL/L — SIGNIFICANT CHANGE UP (ref 98–107)
CO2 SERPL-SCNC: 23 MMOL/L — SIGNIFICANT CHANGE UP (ref 22–31)
COLOR SPEC: YELLOW — SIGNIFICANT CHANGE UP
CREAT SERPL-MCNC: 0.71 MG/DL — SIGNIFICANT CHANGE UP (ref 0.5–1.3)
DIFF PNL FLD: NEGATIVE — SIGNIFICANT CHANGE UP
EGFR: 115 ML/MIN/1.73M2 — SIGNIFICANT CHANGE UP
EOSINOPHIL # BLD AUTO: 0.11 K/UL — SIGNIFICANT CHANGE UP (ref 0–0.5)
EOSINOPHIL NFR BLD AUTO: 2.6 % — SIGNIFICANT CHANGE UP (ref 0–6)
EPI CELLS # UR: 7 /HPF — HIGH (ref 0–5)
GLUCOSE SERPL-MCNC: 86 MG/DL — SIGNIFICANT CHANGE UP (ref 70–99)
GLUCOSE UR QL: NEGATIVE — SIGNIFICANT CHANGE UP
HCT VFR BLD CALC: 42.2 % — SIGNIFICANT CHANGE UP (ref 34.5–45)
HGB BLD-MCNC: 13.8 G/DL — SIGNIFICANT CHANGE UP (ref 11.5–15.5)
HYALINE CASTS # UR AUTO: 2 /LPF — SIGNIFICANT CHANGE UP (ref 0–7)
IANC: 2.08 K/UL — SIGNIFICANT CHANGE UP (ref 1.8–7.4)
KETONES UR-MCNC: ABNORMAL
LEUKOCYTE ESTERASE UR-ACNC: NEGATIVE — SIGNIFICANT CHANGE UP
LIDOCAIN IGE QN: 26 U/L — SIGNIFICANT CHANGE UP (ref 7–60)
LYMPHOCYTES # BLD AUTO: 1.1 K/UL — SIGNIFICANT CHANGE UP (ref 1–3.3)
LYMPHOCYTES # BLD AUTO: 26.1 % — SIGNIFICANT CHANGE UP (ref 13–44)
MCHC RBC-ENTMCNC: 27.1 PG — SIGNIFICANT CHANGE UP (ref 27–34)
MCHC RBC-ENTMCNC: 32.7 GM/DL — SIGNIFICANT CHANGE UP (ref 32–36)
MCV RBC AUTO: 82.7 FL — SIGNIFICANT CHANGE UP (ref 80–100)
MONOCYTES # BLD AUTO: 0.41 K/UL — SIGNIFICANT CHANGE UP (ref 0–0.9)
MONOCYTES NFR BLD AUTO: 9.6 % — SIGNIFICANT CHANGE UP (ref 2–14)
NEUTROPHILS # BLD AUTO: 2.38 K/UL — SIGNIFICANT CHANGE UP (ref 1.8–7.4)
NEUTROPHILS NFR BLD AUTO: 56.5 % — SIGNIFICANT CHANGE UP (ref 43–77)
NITRITE UR-MCNC: NEGATIVE — SIGNIFICANT CHANGE UP
PH UR: 5.5 — SIGNIFICANT CHANGE UP (ref 5–8)
PLAT MORPH BLD: NORMAL — SIGNIFICANT CHANGE UP
PLATELET # BLD AUTO: 216 K/UL — SIGNIFICANT CHANGE UP (ref 150–400)
PLATELET COUNT - ESTIMATE: NORMAL — SIGNIFICANT CHANGE UP
POLYCHROMASIA BLD QL SMEAR: SLIGHT — SIGNIFICANT CHANGE UP
POTASSIUM SERPL-MCNC: 4 MMOL/L — SIGNIFICANT CHANGE UP (ref 3.5–5.3)
POTASSIUM SERPL-SCNC: 4 MMOL/L — SIGNIFICANT CHANGE UP (ref 3.5–5.3)
PROT SERPL-MCNC: 7.7 G/DL — SIGNIFICANT CHANGE UP (ref 6–8.3)
PROT UR-MCNC: ABNORMAL
RBC # BLD: 5.1 M/UL — SIGNIFICANT CHANGE UP (ref 3.8–5.2)
RBC # FLD: 20 % — HIGH (ref 10.3–14.5)
RBC BLD AUTO: ABNORMAL
RBC CASTS # UR COMP ASSIST: 4 /HPF — SIGNIFICANT CHANGE UP (ref 0–4)
SODIUM SERPL-SCNC: 134 MMOL/L — LOW (ref 135–145)
SP GR SPEC: 1.02 — SIGNIFICANT CHANGE UP (ref 1.01–1.05)
UROBILINOGEN FLD QL: SIGNIFICANT CHANGE UP
VARIANT LYMPHS # BLD: 4.3 % — SIGNIFICANT CHANGE UP (ref 0–6)
WBC # BLD: 4.22 K/UL — SIGNIFICANT CHANGE UP (ref 3.8–10.5)
WBC # FLD AUTO: 4.22 K/UL — SIGNIFICANT CHANGE UP (ref 3.8–10.5)
WBC UR QL: 3 /HPF — SIGNIFICANT CHANGE UP (ref 0–5)

## 2022-08-22 PROCEDURE — 99284 EMERGENCY DEPT VISIT MOD MDM: CPT

## 2022-08-22 RX ORDER — FAMOTIDINE 10 MG/ML
20 INJECTION INTRAVENOUS ONCE
Refills: 0 | Status: COMPLETED | OUTPATIENT
Start: 2022-08-22 | End: 2022-08-22

## 2022-08-22 RX ORDER — METOCLOPRAMIDE HCL 10 MG
1 TABLET ORAL
Qty: 56 | Refills: 0
Start: 2022-08-22 | End: 2022-09-04

## 2022-08-22 RX ORDER — METOCLOPRAMIDE HCL 10 MG
10 TABLET ORAL ONCE
Refills: 0 | Status: COMPLETED | OUTPATIENT
Start: 2022-08-22 | End: 2022-08-22

## 2022-08-22 RX ORDER — SODIUM CHLORIDE 9 MG/ML
1000 INJECTION, SOLUTION INTRAVENOUS
Refills: 0 | Status: COMPLETED | OUTPATIENT
Start: 2022-08-22 | End: 2022-08-22

## 2022-08-22 RX ADMIN — SODIUM CHLORIDE 1000 MILLILITER(S): 9 INJECTION, SOLUTION INTRAVENOUS at 14:39

## 2022-08-22 RX ADMIN — FAMOTIDINE 20 MILLIGRAM(S): 10 INJECTION INTRAVENOUS at 14:39

## 2022-08-22 RX ADMIN — Medication 10 MILLIGRAM(S): at 14:39

## 2022-08-22 NOTE — ED PROVIDER NOTE - NSFOLLOWUPINSTRUCTIONS_ED_ALL_ED_FT
You can use Reglan 10mg as needed for nausea and vomiting, up to every 6 hrs.  Continue Pepcid 2x daily.  Follow up with your OB/GYN - you may need to resume omeprazole, as it has improved your symptoms in the past, is safe in pregnancy, and you will be out of the first trimester.  Return to Emergency for any worsening pain, fever, weakness, vomiting, fainting, or any signs of distress.

## 2022-08-22 NOTE — ED PROVIDER NOTE - CLINICAL SUMMARY MEDICAL DECISION MAKING FREE TEXT BOX
32F 13 wk preg., inc. N/V, dec. po intake, sx similar to prev. reflux dz but worse. Feels weak and dizzy, tried pepcid/Tums. Prev. on omeprazole w relief but OB MD dc'd. Labs, fluids, antiemetics, reassess.

## 2022-08-22 NOTE — ED ADULT NURSE NOTE - OBJECTIVE STATEMENT
Receive pt. in Intake 10a alert and oriented x 4, presenting to the ER with c/o heartburn. Pt. stated " I am 13 weeks pregnant and I have heart burn for 3 weeks and it is not going away". Medicated as ordered, labs sent, call bell place within reach.

## 2022-08-22 NOTE — ED ADULT TRIAGE NOTE - CHIEF COMPLAINT QUOTE
Pt with co n/v and heart burn over the past few weeks becoming worse .Pt is 13 weeks pregnant. pt has no gyn complaints.

## 2022-08-22 NOTE — ED PROVIDER NOTE - PATIENT PORTAL LINK FT
You can access the FollowMyHealth Patient Portal offered by [facility] by registering at the following website: [email or web address]. By joining [employer]’s FollowMyHealth portal, you will also be able to view your health information using other applications (apps) compatible with our system.

## 2022-08-22 NOTE — ED PROVIDER NOTE - OBJECTIVE STATEMENT
32F c/o N/V, weakness, fatigue. States has hx of reflux, prev. well controlled on daily omeprazole, was stopped when found to be pregnant but pt. with no relief using Tums, maalox and pepcid bid. now w/ inc. CP after vomiting, even liquids causing nausea, episodic epigastric pain and burning, no current c/o abd pain. Pt. seen in ED for vaginal bleeding last mo., had U/S showing IUP, currently no c/o pelvic pain, back pain, vag bleeding or discharge, denies urinary c/o. Hx of fibroids in the past. Ambulating but feels weak. Hx of asthma, no worsened asthma sx during pregnancy.

## 2022-08-22 NOTE — ED PROVIDER NOTE - PROGRESS NOTE DETAILS
Pt. failing OTC meds for reflux and vomiting, will attempt reglan pre-meal but discussed safety of omeprazole and poss. to resume if no relief w reglan and pepcid as outpt.

## 2022-08-25 ENCOUNTER — LABORATORY RESULT (OUTPATIENT)
Age: 32
End: 2022-08-25

## 2022-08-25 ENCOUNTER — APPOINTMENT (OUTPATIENT)
Dept: ANTEPARTUM | Facility: CLINIC | Age: 32
End: 2022-08-25

## 2022-08-25 ENCOUNTER — ASOB RESULT (OUTPATIENT)
Age: 32
End: 2022-08-25

## 2022-08-25 PROCEDURE — 76801 OB US < 14 WKS SINGLE FETUS: CPT | Mod: 59

## 2022-08-25 PROCEDURE — 76813 OB US NUCHAL MEAS 1 GEST: CPT

## 2022-08-26 ENCOUNTER — APPOINTMENT (OUTPATIENT)
Dept: OBGYN | Facility: CLINIC | Age: 32
End: 2022-08-26

## 2022-09-06 ENCOUNTER — TRANSCRIPTION ENCOUNTER (OUTPATIENT)
Age: 32
End: 2022-09-06

## 2022-09-15 ENCOUNTER — NON-APPOINTMENT (OUTPATIENT)
Age: 32
End: 2022-09-15

## 2022-09-15 ENCOUNTER — APPOINTMENT (OUTPATIENT)
Dept: OBGYN | Facility: CLINIC | Age: 32
End: 2022-09-15

## 2022-09-15 VITALS — DIASTOLIC BLOOD PRESSURE: 77 MMHG | SYSTOLIC BLOOD PRESSURE: 116 MMHG | BODY MASS INDEX: 37.51 KG/M2 | WEIGHT: 254 LBS

## 2022-09-15 DIAGNOSIS — R12 HEARTBURN: ICD-10-CM

## 2022-09-15 DIAGNOSIS — Z34.92 ENCOUNTER FOR SUPERVISION OF NORMAL PREGNANCY, UNSPECIFIED, SECOND TRIMESTER: ICD-10-CM

## 2022-09-15 PROCEDURE — 0502F SUBSEQUENT PRENATAL CARE: CPT

## 2022-09-15 RX ORDER — ASPIRIN 81 MG/1
81 TABLET, COATED ORAL
Qty: 30 | Refills: 1 | Status: ACTIVE | COMMUNITY
Start: 2022-07-19 | End: 1900-01-01

## 2022-09-15 RX ORDER — METOCLOPRAMIDE 10 MG/1
10 TABLET ORAL EVERY 6 HOURS
Qty: 1 | Refills: 1 | Status: ACTIVE | COMMUNITY
Start: 2022-09-15 | End: 1900-01-01

## 2022-09-16 DIAGNOSIS — O99.810 ABNORMAL GLUCOSE COMPLICATING PREGNANCY: ICD-10-CM

## 2022-09-16 LAB — GLUCOSE 1H P 50 G GLC PO SERPL-MCNC: 197 MG/DL

## 2022-09-26 ENCOUNTER — APPOINTMENT (OUTPATIENT)
Dept: MATERNAL FETAL MEDICINE | Facility: CLINIC | Age: 32
End: 2022-09-26

## 2022-09-26 ENCOUNTER — ASOB RESULT (OUTPATIENT)
Age: 32
End: 2022-09-26

## 2022-09-26 DIAGNOSIS — O24.419 GESTATIONAL DIABETES MELLITUS IN PREGNANCY, UNSPECIFIED CONTROL: ICD-10-CM

## 2022-09-26 PROCEDURE — G0109 DIAB MANAGE TRN IND/GROUP: CPT | Mod: 95

## 2022-09-26 RX ORDER — BLOOD-GLUCOSE METER
KIT MISCELLANEOUS 4 TIMES DAILY
Qty: 2 | Refills: 1 | Status: ACTIVE | COMMUNITY
Start: 2022-09-26 | End: 1900-01-01

## 2022-09-26 RX ORDER — LANCETS 33 GAUGE
EACH MISCELLANEOUS
Qty: 4 | Refills: 2 | Status: ACTIVE | COMMUNITY
Start: 2022-09-26 | End: 1900-01-01

## 2022-09-26 RX ORDER — URINE ACETONE TEST STRIPS
STRIP MISCELLANEOUS
Qty: 1 | Refills: 2 | Status: ACTIVE | COMMUNITY
Start: 2022-09-26 | End: 1900-01-01

## 2022-10-02 ENCOUNTER — EMERGENCY (EMERGENCY)
Facility: HOSPITAL | Age: 32
LOS: 1 days | Discharge: STILL ADMITTED OR ADMINISTRATIVE | End: 2022-10-02
Admitting: EMERGENCY MEDICINE

## 2022-10-02 ENCOUNTER — OUTPATIENT (OUTPATIENT)
Dept: INPATIENT UNIT | Facility: HOSPITAL | Age: 32
LOS: 1 days | Discharge: HOME | End: 2022-10-02

## 2022-10-02 VITALS
HEIGHT: 70 IN | TEMPERATURE: 98 F | OXYGEN SATURATION: 100 % | RESPIRATION RATE: 18 BRPM | HEART RATE: 75 BPM | SYSTOLIC BLOOD PRESSURE: 125 MMHG | DIASTOLIC BLOOD PRESSURE: 61 MMHG

## 2022-10-02 VITALS — DIASTOLIC BLOOD PRESSURE: 66 MMHG | HEART RATE: 69 BPM | SYSTOLIC BLOOD PRESSURE: 128 MMHG

## 2022-10-02 VITALS — DIASTOLIC BLOOD PRESSURE: 59 MMHG | TEMPERATURE: 98 F | HEART RATE: 76 BPM | SYSTOLIC BLOOD PRESSURE: 121 MMHG

## 2022-10-02 DIAGNOSIS — O26.899 OTHER SPECIFIED PREGNANCY RELATED CONDITIONS, UNSPECIFIED TRIMESTER: ICD-10-CM

## 2022-10-02 DIAGNOSIS — Z3A.00 WEEKS OF GESTATION OF PREGNANCY NOT SPECIFIED: ICD-10-CM

## 2022-10-02 LAB
APPEARANCE UR: ABNORMAL
BACTERIA # UR AUTO: ABNORMAL
BILIRUB UR-MCNC: NEGATIVE — SIGNIFICANT CHANGE UP
COLOR SPEC: YELLOW — SIGNIFICANT CHANGE UP
COMMENT - URINE: SIGNIFICANT CHANGE UP
DIFF PNL FLD: NEGATIVE — SIGNIFICANT CHANGE UP
EPI CELLS # UR: 15 /HPF — HIGH (ref 0–5)
GLUCOSE UR QL: NEGATIVE — SIGNIFICANT CHANGE UP
HYALINE CASTS # UR AUTO: 0 /LPF — SIGNIFICANT CHANGE UP (ref 0–7)
KETONES UR-MCNC: ABNORMAL
LEUKOCYTE ESTERASE UR-ACNC: NEGATIVE — SIGNIFICANT CHANGE UP
NITRITE UR-MCNC: NEGATIVE — SIGNIFICANT CHANGE UP
PH UR: 6 — SIGNIFICANT CHANGE UP (ref 5–8)
PROT UR-MCNC: ABNORMAL
RBC CASTS # UR COMP ASSIST: 7 /HPF — HIGH (ref 0–4)
SP GR SPEC: 1.04 — SIGNIFICANT CHANGE UP (ref 1.01–1.05)
UROBILINOGEN FLD QL: SIGNIFICANT CHANGE UP
WBC UR QL: 9 /HPF — HIGH (ref 0–5)

## 2022-10-02 PROCEDURE — 99214 OFFICE O/P EST MOD 30 MIN: CPT | Mod: 25

## 2022-10-02 PROCEDURE — 76817 TRANSVAGINAL US OBSTETRIC: CPT | Mod: 26

## 2022-10-02 PROCEDURE — 76805 OB US >/= 14 WKS SNGL FETUS: CPT | Mod: 26

## 2022-10-02 PROCEDURE — L9996: CPT

## 2022-10-02 RX ORDER — CITRIC ACID/SODIUM CITRATE 300-500 MG
30 SOLUTION, ORAL ORAL ONCE
Refills: 0 | Status: DISCONTINUED | OUTPATIENT
Start: 2022-10-02 | End: 2022-10-16

## 2022-10-02 RX ORDER — SIMETHICONE 80 MG/1
160 TABLET, CHEWABLE ORAL ONCE
Refills: 0 | Status: COMPLETED | OUTPATIENT
Start: 2022-10-02 | End: 2022-10-02

## 2022-10-02 RX ORDER — FAMOTIDINE 10 MG/ML
20 INJECTION INTRAVENOUS DAILY
Refills: 0 | Status: DISCONTINUED | OUTPATIENT
Start: 2022-10-02 | End: 2022-10-16

## 2022-10-02 RX ADMIN — SIMETHICONE 160 MILLIGRAM(S): 80 TABLET, CHEWABLE ORAL at 05:17

## 2022-10-02 RX ADMIN — FAMOTIDINE 20 MILLIGRAM(S): 10 INJECTION INTRAVENOUS at 06:52

## 2022-10-02 NOTE — OB PROVIDER TRIAGE NOTE - HISTORY OF PRESENT ILLNESS
33 yo , EGA@18 5/7 weeks, presented to D&T with c/o abdominal gas like achiness since yesterday around 10 PM pain 8/10 pt report that frank took Pepcid Mylanta and Reglan with no relief. Pt report that frank had her last bowel movement on Thursday night. pt denies contractions, vaginal bleeding, leakage of fluid, and reports fetal movement.  Denies fever, chills, headaches, changes in vision, chest pain, palpitations, shortness of breath, cough, nausea, vomiting, diarrhea, urinary symptoms, edema.    Prenatal care with Dr. alba  Prenatal course is complicated by HA1C 5.9 GCT 22 197 GDMA-1 as per patient.     Patient denies signs and symptoms of COVID 19; denies symptomatic illness; fully vaccinated.    No adverse reactions to anesthesia, no objections to blood transfusions if clinically indicated.  OB hx: primigravida  Med hx:  ashthma 5 years old no intubation or hospitalization  GERD  Surg hx:  tonsillectomy as a teenager  GYN hx: Fibroid, denies hx of abnormal papsmear/cysts/STDs  Meds: PNV, Pepcid, reglan  Allergies:     Social hx: Denies alcohol, tobacco, drug use  Psych hx: denies hx of anxiety/depression; lives with spouse

## 2022-10-02 NOTE — ED ADULT TRIAGE NOTE - CHIEF COMPLAINT QUOTE
Pt st" I am 18 weeks 4 days pregnant  (due date 2/28/23 )and having bad stomach pains since yesterday...feels like gas comes and goes increases when I get up." Denies vomiting, Denies vag bleeding. Denies chest pain , denies shortness of breath.

## 2022-10-02 NOTE — OB RN TRIAGE NOTE - FALL HARM RISK - UNIVERSAL INTERVENTIONS
Bed in lowest position, wheels locked, appropriate side rails in place/Call bell, personal items and telephone in reach/Instruct patient to call for assistance before getting out of bed or chair/Non-slip footwear when patient is out of bed/[place] to call system/Physically safe environment - no spills, clutter or unnecessary equipment/Purposeful Proactive Rounding/Room/bathroom lighting operational, light cord in reach

## 2022-10-02 NOTE — OB PROVIDER TRIAGE NOTE - ADDITIONAL INSTRUCTIONS
- Educated and discussed pre term labor precautions  - Educated and discussed concerning signs/symptoms to call OB/GYN and return to L&D including but not limited to vaginal bleeding, leakage of fluid, painful contractions, decreased fetal movement, fever/chills, shortness of breath, chest pain, rash, difficulty ambulating, nausea/vomiting/diarrhea, worsening of symptoms  - Patient states she understands and agrees with assessment and plan, all questions answered  - Encourage 2L fluid daily  - Colace recommended for stool softener

## 2022-10-02 NOTE — OB PROVIDER TRIAGE NOTE - NSOBPROVIDERNOTE_OBGYN_ALL_OB_FT
33 yo , EGA@18 5/7 weeks upper abdomen gas achiness.   simethicone and diet ginger ale  Pt report that the pain now is 6/10  0630 discuss with dr. Carina montana 31 yo , EGA@18 5/7 weeks upper abdomen gas achiness.   simethicone and diet ginger ale  Pt report that the pain now is 6/10  0630 discuss with dr. Carina montana    sign out from night team 0700:  pt reports pain relief 3/10.  CL: 2.2-2.4cm  anatomy scan 10/10 w/ TVS, and return PN appt 10/13  pelvic rest  d/w Dr. Lobato.  plan for d.c home per Dr. Lobato    - Educated and discussed pre term labor precautions  - Educated and discussed concerning signs/symptoms to call OB/GYN and return to L&D including but not limited to vaginal bleeding, leakage of fluid, painful contractions, decreased fetal movement, fever/chills, shortness of breath, chest pain, rash, difficulty ambulating, nausea/vomiting/diarrhea, worsening of symptoms  - Patient states she understands and agrees with assessment and plan, all questions answered  - Encourage 2L fluid daily  - Colace recommended for stool softener

## 2022-10-02 NOTE — OB PROVIDER TRIAGE NOTE - NSHPPHYSICALEXAM_GEN_ALL_CORE
Vital Signs Last 24 Hrs  T(C): 36.9 (02 Oct 2022 04:45), Max: 36.9 (02 Oct 2022 04:43)  T(F): 98.4 (02 Oct 2022 04:45), Max: 98.42 (02 Oct 2022 04:43)  HR: 76 (02 Oct 2022 04:45) (75 - 76)  BP: 121/59 (02 Oct 2022 04:45) (121/59 - 125/61)  RR: 18 (02 Oct 2022 04:45) (18 - 18)  SpO2: 100% (02 Oct 2022 03:54) (100% - 100%)    Parameters below as of 02 Oct 2022 04:45  Patient On (Oxygen Delivery Method): room air        Gen: NAD  Head: NC/AT  Cardio: S1S2+, RRR  Resp: CTABL, no wheezing  Abdomen: Soft, NT/ND, BS+  Extremities: No LE edema bilaterally  TAUS: FHR 162BPM, anterior placenta, MVP 4.02    Plan:  UA sent  simethicone   Pepcid Vital Signs Last 24 Hrs  T(C): 36.9 (02 Oct 2022 04:45), Max: 36.9 (02 Oct 2022 04:43)  T(F): 98.4 (02 Oct 2022 04:45), Max: 98.42 (02 Oct 2022 04:43)  HR: 76 (02 Oct 2022 04:45) (75 - 76)  BP: 121/59 (02 Oct 2022 04:45) (121/59 - 125/61)  RR: 18 (02 Oct 2022 04:45) (18 - 18)  SpO2: 100% (02 Oct 2022 03:54) (100% - 100%)    Parameters below as of 02 Oct 2022 04:45  Patient On (Oxygen Delivery Method): room air        Gen: NAD  Head: NC/AT  Cardio: S1S2+, RRR  Resp: CTABL, no wheezing  Abdomen: Soft, NT/ND, BS+  Extremities: No LE edema bilaterally  TAUS: FHR 162BPM, anterior placenta, MVP 4.02    Plan:  UA sent  simethicone   Pepcid    Sign out from night team 0700:  SSE: cervix appears closed, no bulging membranes, mild leukorrhea noted.  TVS: CL 2.2-2.4cm, no funneling, no dynamic changes.

## 2022-10-03 ENCOUNTER — EMERGENCY (EMERGENCY)
Facility: HOSPITAL | Age: 32
LOS: 1 days | Discharge: HOME | End: 2022-10-03
Attending: STUDENT IN AN ORGANIZED HEALTH CARE EDUCATION/TRAINING PROGRAM | Admitting: STUDENT IN AN ORGANIZED HEALTH CARE EDUCATION/TRAINING PROGRAM

## 2022-10-03 VITALS
OXYGEN SATURATION: 100 % | RESPIRATION RATE: 16 BRPM | HEIGHT: 70 IN | DIASTOLIC BLOOD PRESSURE: 79 MMHG | SYSTOLIC BLOOD PRESSURE: 117 MMHG | TEMPERATURE: 98 F | HEART RATE: 74 BPM

## 2022-10-03 PROCEDURE — 10060 I&D ABSCESS SIMPLE/SINGLE: CPT

## 2022-10-03 PROCEDURE — 99283 EMERGENCY DEPT VISIT LOW MDM: CPT | Mod: 25

## 2022-10-03 RX ORDER — ACETAMINOPHEN 500 MG
650 TABLET ORAL ONCE
Refills: 0 | Status: COMPLETED | OUTPATIENT
Start: 2022-10-03 | End: 2022-10-03

## 2022-10-03 RX ORDER — LIDOCAINE HCL 20 MG/ML
5 VIAL (ML) INJECTION ONCE
Refills: 0 | Status: DISCONTINUED | OUTPATIENT
Start: 2022-10-03 | End: 2022-10-06

## 2022-10-03 NOTE — ED PROVIDER NOTE - PATIENT PORTAL LINK FT
You can access the FollowMyHealth Patient Portal offered by [facility] by registering at the following website: [email or web address]. By joining [employer]’s FollowMyHealth portal, you will also be able to view your health information using other applications (apps) compatible with our system.

## 2022-10-03 NOTE — ED PROVIDER NOTE - NS ED ATTENDING STATEMENT MOD
I have seen and examined this patient and fully participated in the care of this patient as the teaching attending.  The service was shared with the [facility].  I reviewed and verified the documentation and independently performed the documented:

## 2022-10-03 NOTE — ED PROCEDURE NOTE - PROCEDURE ADDITIONAL DETAILS
Emperatriz BECK: pt presented with c/f paronychia vs felon w acrylic nail in place, attempted removal of acrylic nail, was unsuccessful, pt with area of max fluctuance just inferior to nailbed ulnar aspect, attempt i&d at that site, was able to express copious amount of sebaceous cyst like fluid, tolerated procedure well.   pt encouraged to preform good wound care at home w topical abx, offered po, she declines as is pregnant, will return to ED in x2 days for wound check. Strict return precautions were discussed. Pt expressed understanding.

## 2022-10-03 NOTE — ED PROVIDER NOTE - PROGRESS NOTE DETAILS
pt offered PO antibiotics, however, she refused and pt states she prefers to use topical abx.   pt is advised to return to ED in 2 days for wound check or sooner with any worsening symptoms. Fetal Heart rate is 150 Emperatriz BECK: (Back Charting) unable to remove artificial nail, given max area of fluctuance noted on PE attempted direct I&D at that site as opposed to more traditional method of paronychia or felon I&D as suspect would have higher success rate over area of max fluctuance, copious discharged expressed as in procedure note. tolerated procedure well.

## 2022-10-03 NOTE — ED ADULT TRIAGE NOTE - CHIEF COMPLAINT QUOTE
Pt. c/o swelling and pain to right index finger x 2 days. States she's 18wk pregnant (2/28), seen here two days ago for abdominal pain. Denies fevers.

## 2022-10-03 NOTE — ED PROVIDER NOTE - NSFOLLOWUPINSTRUCTIONS_ED_ALL_ED_FT
Apply topical antibiotics ointment as instructed.   keep the wound clean.   Return to Emergency room in 2 days for wound check or sooner with any worsening symptoms.  Follow up with Hand Surgery and PMD.

## 2022-10-03 NOTE — ED PROVIDER NOTE - PHYSICAL EXAMINATION
MSK: right index finger tip with swelling and tenderness. hard. sensation intact. no weakness. MSK: right index finger tip with swelling and tenderness. sensation intact. no weakness.

## 2022-10-03 NOTE — ED PROVIDER NOTE - ATTENDING CONTRIBUTION TO CARE
I have personally performed a face to face medical and diagnostic evaluation of the patient. I have discussed with and reviewed the Resident's and/or ACP's and/or Medical/PA/NP student's note and agree with the History, ROS, Physical Exam and MDM unless otherwise indicated. A brief summary of my personal evaluation and impression can be found below.    Emperatriz BECK: 32F 15w preg followed by OB gDM asthma presents with a cc of R 2nd digit distal pain and swelling noticed yesterday morning pressure throbbing pain that comes and goes, had this before and was drained recently went to nail salon and had fake nails done. No fever. Recent visit for abdominal pain reports is doing much better, no vaginal discharge or bleeding, good fetal movements. No urinary complaints. No rashes or pain elsewhere.     All other ROS negative, except as above and as per HPI and ROS section.    VITALS: Initial triage and subsequent vitals have been reviewed by me.  GEN APPEARANCE: WDWN, alert, non-toxic, NAD  HEAD: Atraumatic.  EYES: PERRLa, EOMI, vision grossly intact.   NECK: Supple  CV: RRR, S1S2, no c/r/m/g. Cap refill <2 seconds. No bruits.   LUNGS: CTAB. No abnormal breath sounds.  ABDOMEN: Soft, NTND. No guarding or rebound.   MSK/EXT: No spinal or extremity point tenderness. No CVA ttp. Pelvis stable. No peripheral edema.  NEURO: Alert, follows commands. Weight bearing normal. Speech normal. Sensation and motor normal x4 extremities.   SKIN: R 2nd finger w distal to DIP swelling erythremia, mild warmth, no swelling elsewhere, distal fingertip firm but compressible w mild ttp, area of purulence noted at distal finger tip just beneath nailbed ulnar aspect acrylic nails in place,   PSYCH: Appropriate    Plan/MDM: Emperatriz BECK: 32F 15w preg followed by OB gDM asthma presents with a cc of R 2nd digit distal pain and swelling noticed yesterday morning pressure throbbing pain that comes and goes, had this before and was drained recently went to nail salon and had fake nails done. No fever. Recent visit for abdominal pain reports is doing much better, no vaginal discharge or bleeding, good fetal movements. No urinary complaints. No rashes or pain elsewhere. exam vss non toxic PE as above ddx c/f paronychia v felon, however assessment is limited 2/2 artifical nail in place, will attempt to move, perform I&D, likely dc thereafter.

## 2022-10-03 NOTE — ED PROVIDER NOTE - CLINICAL SUMMARY MEDICAL DECISION MAKING FREE TEXT BOX
33 yo female with right index finger swelling. concern for paronychia and Felon.   pt Is wearing a Kerlix nail. will attempt to remove it for better visualization and plan for I&D

## 2022-10-03 NOTE — ED PROVIDER NOTE - OBJECTIVE STATEMENT
31 yo F at 18 weeks of gestation with PMH of gestational DM and asthma presents to the ED complaining of R 2nd digit finger pain/swelling. She notes it started all of a sudden yesterday morning at 8 am. Pain comes and goes and feels like pressure on the distal digit pulp,. Patient endorses swelling and redness. Patient states she has had this before many years ago where they were able to drain it. She denies any fevers, chills, blood, pus, discharge, warmth, numbness, tingling, dizziness, SOB.

## 2022-10-04 ENCOUNTER — ASOB RESULT (OUTPATIENT)
Age: 32
End: 2022-10-04

## 2022-10-05 ENCOUNTER — APPOINTMENT (OUTPATIENT)
Dept: MATERNAL FETAL MEDICINE | Facility: CLINIC | Age: 32
End: 2022-10-05

## 2022-10-05 PROCEDURE — G0108 DIAB MANAGE TRN  PER INDIV: CPT | Mod: 95

## 2022-10-10 ENCOUNTER — APPOINTMENT (OUTPATIENT)
Dept: ANTEPARTUM | Facility: CLINIC | Age: 32
End: 2022-10-10

## 2022-10-10 ENCOUNTER — ASOB RESULT (OUTPATIENT)
Age: 32
End: 2022-10-10

## 2022-10-10 PROCEDURE — 76811 OB US DETAILED SNGL FETUS: CPT

## 2022-10-10 PROCEDURE — 76817 TRANSVAGINAL US OBSTETRIC: CPT

## 2022-10-13 ENCOUNTER — NON-APPOINTMENT (OUTPATIENT)
Age: 32
End: 2022-10-13

## 2022-10-13 ENCOUNTER — APPOINTMENT (OUTPATIENT)
Dept: OBGYN | Facility: CLINIC | Age: 32
End: 2022-10-13

## 2022-10-13 VITALS
BODY MASS INDEX: 35.99 KG/M2 | DIASTOLIC BLOOD PRESSURE: 66 MMHG | WEIGHT: 243 LBS | RESPIRATION RATE: 18 BRPM | HEIGHT: 69 IN | HEART RATE: 71 BPM | OXYGEN SATURATION: 99 % | SYSTOLIC BLOOD PRESSURE: 111 MMHG

## 2022-10-13 PROCEDURE — 0502F SUBSEQUENT PRENATAL CARE: CPT

## 2022-10-17 ENCOUNTER — ASOB RESULT (OUTPATIENT)
Age: 32
End: 2022-10-17

## 2022-10-17 ENCOUNTER — APPOINTMENT (OUTPATIENT)
Dept: MATERNAL FETAL MEDICINE | Facility: CLINIC | Age: 32
End: 2022-10-17

## 2022-10-17 PROCEDURE — G0108 DIAB MANAGE TRN  PER INDIV: CPT | Mod: 95

## 2022-10-24 ENCOUNTER — APPOINTMENT (OUTPATIENT)
Dept: ANTEPARTUM | Facility: CLINIC | Age: 32
End: 2022-10-24

## 2022-10-24 ENCOUNTER — ASOB RESULT (OUTPATIENT)
Age: 32
End: 2022-10-24

## 2022-10-24 PROCEDURE — 76816 OB US FOLLOW-UP PER FETUS: CPT

## 2022-10-28 ENCOUNTER — APPOINTMENT (OUTPATIENT)
Dept: PEDIATRIC CARDIOLOGY | Facility: CLINIC | Age: 32
End: 2022-10-28

## 2022-10-28 PROCEDURE — 99202 OFFICE O/P NEW SF 15 MIN: CPT | Mod: 25

## 2022-10-28 PROCEDURE — 76827 ECHO EXAM OF FETAL HEART: CPT

## 2022-10-28 PROCEDURE — 76820 UMBILICAL ARTERY ECHO: CPT

## 2022-10-28 PROCEDURE — 76825 ECHO EXAM OF FETAL HEART: CPT

## 2022-10-28 PROCEDURE — 93325 DOPPLER ECHO COLOR FLOW MAPG: CPT | Mod: 59

## 2022-10-31 ENCOUNTER — ASOB RESULT (OUTPATIENT)
Age: 32
End: 2022-10-31

## 2022-10-31 ENCOUNTER — APPOINTMENT (OUTPATIENT)
Dept: MATERNAL FETAL MEDICINE | Facility: CLINIC | Age: 32
End: 2022-10-31

## 2022-10-31 PROCEDURE — G0108 DIAB MANAGE TRN  PER INDIV: CPT | Mod: 95

## 2022-11-10 ENCOUNTER — APPOINTMENT (OUTPATIENT)
Dept: OBGYN | Facility: CLINIC | Age: 32
End: 2022-11-10

## 2022-11-10 VITALS
HEIGHT: 69 IN | WEIGHT: 248 LBS | BODY MASS INDEX: 36.73 KG/M2 | HEART RATE: 69 BPM | SYSTOLIC BLOOD PRESSURE: 126 MMHG | DIASTOLIC BLOOD PRESSURE: 72 MMHG

## 2022-11-10 PROCEDURE — 0502F SUBSEQUENT PRENATAL CARE: CPT

## 2022-11-14 ENCOUNTER — EMERGENCY (EMERGENCY)
Facility: HOSPITAL | Age: 32
LOS: 1 days | Discharge: STILL ADMITTED OR ADMINISTRATIVE | End: 2022-11-14
Admitting: EMERGENCY MEDICINE

## 2022-11-14 ENCOUNTER — OUTPATIENT (OUTPATIENT)
Dept: OUTPATIENT SERVICES | Facility: HOSPITAL | Age: 32
LOS: 1 days | End: 2022-11-14
Payer: MEDICAID

## 2022-11-14 ENCOUNTER — OUTPATIENT (OUTPATIENT)
Dept: OUTPATIENT SERVICES | Facility: HOSPITAL | Age: 32
LOS: 1 days | Discharge: HOME | End: 2022-11-14

## 2022-11-14 ENCOUNTER — APPOINTMENT (OUTPATIENT)
Dept: ULTRASOUND IMAGING | Facility: IMAGING CENTER | Age: 32
End: 2022-11-14

## 2022-11-14 ENCOUNTER — APPOINTMENT (OUTPATIENT)
Dept: OBGYN | Facility: CLINIC | Age: 32
End: 2022-11-14

## 2022-11-14 VITALS
OXYGEN SATURATION: 100 % | RESPIRATION RATE: 18 BRPM | HEIGHT: 70 IN | DIASTOLIC BLOOD PRESSURE: 46 MMHG | SYSTOLIC BLOOD PRESSURE: 134 MMHG | HEART RATE: 79 BPM | TEMPERATURE: 98 F

## 2022-11-14 VITALS
DIASTOLIC BLOOD PRESSURE: 79 MMHG | SYSTOLIC BLOOD PRESSURE: 127 MMHG | HEIGHT: 69 IN | HEART RATE: 76 BPM | BODY MASS INDEX: 35.99 KG/M2 | WEIGHT: 243 LBS

## 2022-11-14 VITALS — DIASTOLIC BLOOD PRESSURE: 56 MMHG | SYSTOLIC BLOOD PRESSURE: 122 MMHG | HEART RATE: 71 BPM

## 2022-11-14 VITALS
DIASTOLIC BLOOD PRESSURE: 58 MMHG | RESPIRATION RATE: 17 BRPM | SYSTOLIC BLOOD PRESSURE: 121 MMHG | TEMPERATURE: 98 F | HEART RATE: 77 BPM

## 2022-11-14 DIAGNOSIS — O26.899 OTHER SPECIFIED PREGNANCY RELATED CONDITIONS, UNSPECIFIED TRIMESTER: ICD-10-CM

## 2022-11-14 DIAGNOSIS — Z3A.00 WEEKS OF GESTATION OF PREGNANCY NOT SPECIFIED: ICD-10-CM

## 2022-11-14 DIAGNOSIS — Z90.89 ACQUIRED ABSENCE OF OTHER ORGANS: Chronic | ICD-10-CM

## 2022-11-14 DIAGNOSIS — R10.13 OTHER SPECIFIED PREGNANCY RELATED CONDITIONS, UNSPECIFIED TRIMESTER: ICD-10-CM

## 2022-11-14 LAB
APPEARANCE UR: ABNORMAL
BACTERIA # UR AUTO: ABNORMAL
BILIRUB UR-MCNC: NEGATIVE — SIGNIFICANT CHANGE UP
COLOR SPEC: ABNORMAL
DIFF PNL FLD: NEGATIVE — SIGNIFICANT CHANGE UP
EPI CELLS # UR: >27 /HPF — HIGH (ref 0–5)
GLUCOSE UR QL: NEGATIVE — SIGNIFICANT CHANGE UP
HYALINE CASTS # UR AUTO: 5 /LPF — SIGNIFICANT CHANGE UP (ref 0–7)
KETONES UR-MCNC: NEGATIVE — SIGNIFICANT CHANGE UP
LEUKOCYTE ESTERASE UR-ACNC: ABNORMAL
NITRITE UR-MCNC: NEGATIVE — SIGNIFICANT CHANGE UP
PH UR: 6 — SIGNIFICANT CHANGE UP (ref 5–8)
PROT UR-MCNC: ABNORMAL
RBC CASTS # UR COMP ASSIST: 1 /HPF — SIGNIFICANT CHANGE UP (ref 0–4)
SP GR SPEC: 1.02 — SIGNIFICANT CHANGE UP (ref 1.01–1.05)
UROBILINOGEN FLD QL: SIGNIFICANT CHANGE UP
WBC UR QL: 35 /HPF — HIGH (ref 0–5)

## 2022-11-14 PROCEDURE — L9996: CPT

## 2022-11-14 PROCEDURE — 76816 OB US FOLLOW-UP PER FETUS: CPT | Mod: 26

## 2022-11-14 PROCEDURE — 76700 US EXAM ABDOM COMPLETE: CPT | Mod: 26

## 2022-11-14 PROCEDURE — 76700 US EXAM ABDOM COMPLETE: CPT

## 2022-11-14 PROCEDURE — 76818 FETAL BIOPHYS PROFILE W/NST: CPT | Mod: 26

## 2022-11-14 PROCEDURE — 99213 OFFICE O/P EST LOW 20 MIN: CPT

## 2022-11-14 RX ORDER — FAMOTIDINE 20 MG/1
20 TABLET, FILM COATED ORAL
Qty: 60 | Refills: 5 | Status: ACTIVE | COMMUNITY
Start: 2022-11-14 | End: 1900-01-01

## 2022-11-14 RX ORDER — CITRIC ACID/SODIUM CITRATE 300-500 MG
30 SOLUTION, ORAL ORAL ONCE
Refills: 0 | Status: COMPLETED | OUTPATIENT
Start: 2022-11-14 | End: 2022-11-14

## 2022-11-14 RX ADMIN — Medication 30 MILLILITER(S): at 03:13

## 2022-11-14 NOTE — OB PROVIDER TRIAGE NOTE - ADDITIONAL INSTRUCTIONS
d/w Dr Hernadez d/c home at 24.6 wks improved reflux pain  maternal and fetal surveillance reassuring  rest activity as tolerated  increase water intake   labor precautions instructed  keep all OB appointments  may use maternity band for abdominal support while standing  return for vaginal bleeding, leakage of fluid, decreased fetal movement or any concerns  v/w discharge instructions given with verbal understanding by patient

## 2022-11-14 NOTE — OB PROVIDER TRIAGE NOTE - NSOBPROVIDERNOTE_OBGYN_ALL_OB_FT
NST  TAS  ua urine cx  Bicitra 30ml x 1 NST  TAS  ua urine cx  Bicitra 30ml x 1  pt sleeping feels much better than when she came in, patient requesting to go home  d/w Dr Hernadez d/c home at 24.6 wks improved reflux pain  maternal and fetal surveillance reassuring  rest activity as tolerated  increase water intake   labor precautions instructed  keep all OB appointments  may use maternity band for abdominal support while standing  return for vaginal bleeding, leakage of fluid, decreased fetal movement or any concerns  v/w discharge instructions given with verbal understanding by patient

## 2022-11-14 NOTE — ED ADULT TRIAGE NOTE - CHIEF COMPLAINT QUOTE
pregnant- abd pain    pt is approximately 24 weeks pregnant.  due date feb 28. ob-gyn is dr park.    c/o abd pain but no bleeding.  has gestational diabetes.

## 2022-11-14 NOTE — OB RN TRIAGE NOTE - NSICDXPASTMEDICALHX_GEN_ALL_CORE_FT
PAST MEDICAL HISTORY:  Asthma Hospitalization as a child , no intubation , no recent episode    Fibroids     Ovarian cyst

## 2022-11-14 NOTE — OB PROVIDER TRIAGE NOTE - NSHPPHYSICALEXAM_GEN_ALL_CORE
abd soft obese gravid NT negative CVA tenderness, skin intact no rashes or lesions, BS x 4 quads, denies pain on palpation, feels soreness mid upper epigastric pain from acid reflux  CV RRR  LS clear bilaterally  TAS:  breech  anterior placenta  +FM +FHR  EFW: 672g/1#8  JUANY:MFI:5.86  FHT: moderate variability, +accelerations 10x10, + variables  toco: none abd soft obese gravid NT negative CVA tenderness, skin intact no rashes or lesions, BS x 4 quads, denies pain on palpation, feels soreness mid upper epigastric pain from acid reflux  CV RRR  LS clear bilaterally  TAS:  breech  anterior placenta  +FM +FHR  EFW: 672g/1#8  JUANY:MFI:5.86  FHT: moderate variability, +accelerations 10x10, + variables  toco: none  Vital Signs Last 24 Hrs  T(C): 36.7 (14 Nov 2022 01:33), Max: 36.8 (14 Nov 2022 00:57)  T(F): 98.06 (14 Nov 2022 01:33), Max: 98.2 (14 Nov 2022 00:57)  HR: 77 (14 Nov 2022 01:40) (77 - 79)  BP: 121/58 (14 Nov 2022 01:40) (121/58 - 134/46)  BP(mean): --  RR: 17 (14 Nov 2022 01:23) (17 - 18)  SpO2: 100% (14 Nov 2022 00:57) (100% - 100%)

## 2022-11-14 NOTE — OB PROVIDER TRIAGE NOTE - HISTORY OF PRESENT ILLNESS
31 yo AA obese female  c/o upper abdominal pain worse before eating- feels like my acid reflux, nonradiating, since Friday. seen here last week for same thing. AP course complicated by GDMA1. denies fever chills dysuria constipation n/v/d new swelling vision changes cp sob or cough. last saw OB thursday had anatomy scan 10/27/22    meds: PNV Pepcid(last dose 9pm) proventil last dose a few months ago prior to pregnancy  all: denies  PMH: asthma hospitala Slight a child  PSH: tonsilectomy  gyn hx: ovarian cysts  ob hx: denies

## 2022-11-14 NOTE — OB PROVIDER TRIAGE NOTE - NSHPLABSRESULTS_GEN_ALL_CORE
Urinalysis Basic - ( 2022 01:50 )    Color: Light Orange / Appearance: Turbid / S.024 / pH: x  Gluc: x / Ketone: Negative  / Bili: Negative / Urobili: <2 mg/dL   Blood: x / Protein: 30 mg/dL / Nitrite: Negative   Leuk Esterase: Large / RBC: 1 /HPF / WBC 35 /HPF   Sq Epi: x / Non Sq Epi: >27 /HPF / Bacteria: Many    urine cx sent

## 2022-11-15 PROBLEM — J45.909 UNSPECIFIED ASTHMA, UNCOMPLICATED: Chronic | Status: ACTIVE | Noted: 2022-06-23

## 2022-11-15 LAB
ALBUMIN SERPL ELPH-MCNC: 3.5 G/DL
ALP BLD-CCNC: 69 U/L
ALT SERPL-CCNC: 8 U/L
ANION GAP SERPL CALC-SCNC: 14 MMOL/L
AST SERPL-CCNC: 15 U/L
BILIRUB SERPL-MCNC: 0.3 MG/DL
BUN SERPL-MCNC: 10 MG/DL
CALCIUM SERPL-MCNC: 9.3 MG/DL
CHLORIDE SERPL-SCNC: 101 MMOL/L
CO2 SERPL-SCNC: 24 MMOL/L
CREAT SERPL-MCNC: 0.63 MG/DL
CULTURE RESULTS: SIGNIFICANT CHANGE UP
EGFR: 120 ML/MIN/1.73M2
GLUCOSE SERPL-MCNC: 86 MG/DL
POTASSIUM SERPL-SCNC: 4.3 MMOL/L
PROT SERPL-MCNC: 6.8 G/DL
SODIUM SERPL-SCNC: 138 MMOL/L
SPECIMEN SOURCE: SIGNIFICANT CHANGE UP

## 2022-11-17 ENCOUNTER — APPOINTMENT (OUTPATIENT)
Dept: MATERNAL FETAL MEDICINE | Facility: CLINIC | Age: 32
End: 2022-11-17

## 2022-11-17 ENCOUNTER — ASOB RESULT (OUTPATIENT)
Age: 32
End: 2022-11-17

## 2022-11-17 PROCEDURE — G0108 DIAB MANAGE TRN  PER INDIV: CPT | Mod: 95

## 2022-11-18 RX ORDER — BLOOD-GLUCOSE METER
W/DEVICE KIT MISCELLANEOUS
Qty: 1 | Refills: 0 | Status: ACTIVE | COMMUNITY
Start: 2022-09-26 | End: 1900-01-01

## 2022-12-05 ENCOUNTER — ASOB RESULT (OUTPATIENT)
Age: 32
End: 2022-12-05

## 2022-12-05 ENCOUNTER — APPOINTMENT (OUTPATIENT)
Dept: ANTEPARTUM | Facility: CLINIC | Age: 32
End: 2022-12-05

## 2022-12-05 PROCEDURE — 76819 FETAL BIOPHYS PROFIL W/O NST: CPT | Mod: 59

## 2022-12-05 PROCEDURE — 76816 OB US FOLLOW-UP PER FETUS: CPT

## 2022-12-08 ENCOUNTER — NON-APPOINTMENT (OUTPATIENT)
Age: 32
End: 2022-12-08

## 2022-12-08 ENCOUNTER — APPOINTMENT (OUTPATIENT)
Dept: MATERNAL FETAL MEDICINE | Facility: CLINIC | Age: 32
End: 2022-12-08

## 2022-12-08 ENCOUNTER — APPOINTMENT (OUTPATIENT)
Dept: OBGYN | Facility: CLINIC | Age: 32
End: 2022-12-08

## 2022-12-08 VITALS
HEART RATE: 86 BPM | SYSTOLIC BLOOD PRESSURE: 134 MMHG | RESPIRATION RATE: 18 BRPM | OXYGEN SATURATION: 98 % | HEIGHT: 69 IN | WEIGHT: 253 LBS | DIASTOLIC BLOOD PRESSURE: 79 MMHG | BODY MASS INDEX: 37.47 KG/M2

## 2022-12-08 PROCEDURE — 0502F SUBSEQUENT PRENATAL CARE: CPT

## 2022-12-08 PROCEDURE — G0108 DIAB MANAGE TRN  PER INDIV: CPT | Mod: 95

## 2022-12-16 ENCOUNTER — APPOINTMENT (OUTPATIENT)
Dept: MATERNAL FETAL MEDICINE | Facility: CLINIC | Age: 32
End: 2022-12-16

## 2022-12-16 ENCOUNTER — ASOB RESULT (OUTPATIENT)
Age: 32
End: 2022-12-16

## 2022-12-16 ENCOUNTER — NON-APPOINTMENT (OUTPATIENT)
Age: 32
End: 2022-12-16

## 2022-12-16 PROCEDURE — G0108 DIAB MANAGE TRN  PER INDIV: CPT | Mod: 95

## 2022-12-16 RX ORDER — ISOPROPYL ALCOHOL 70 ML/100ML
SWAB TOPICAL
Qty: 4 | Refills: 1 | Status: ACTIVE | COMMUNITY
Start: 2022-12-16 | End: 1900-01-01

## 2022-12-19 ENCOUNTER — NON-APPOINTMENT (OUTPATIENT)
Age: 32
End: 2022-12-19

## 2022-12-20 RX ORDER — CHOLECALCIFEROL (VITAMIN D3) 10(400)/ML
32G X 4 MM DROPS ORAL
Qty: 4 | Refills: 2 | Status: ACTIVE | COMMUNITY
Start: 2022-12-16 | End: 1900-01-01

## 2022-12-29 ENCOUNTER — NON-APPOINTMENT (OUTPATIENT)
Age: 32
End: 2022-12-29

## 2022-12-29 ENCOUNTER — APPOINTMENT (OUTPATIENT)
Dept: OBGYN | Facility: CLINIC | Age: 32
End: 2022-12-29
Payer: MEDICAID

## 2022-12-29 VITALS
BODY MASS INDEX: 37.47 KG/M2 | HEIGHT: 69 IN | DIASTOLIC BLOOD PRESSURE: 74 MMHG | HEART RATE: 94 BPM | WEIGHT: 253 LBS | SYSTOLIC BLOOD PRESSURE: 117 MMHG

## 2022-12-29 PROCEDURE — 0502F SUBSEQUENT PRENATAL CARE: CPT

## 2022-12-30 LAB
BASOPHILS # BLD AUTO: 0.07 K/UL
BASOPHILS NFR BLD AUTO: 0.7 %
EOSINOPHIL # BLD AUTO: 0.13 K/UL
EOSINOPHIL NFR BLD AUTO: 1.3 %
ESTIMATED AVERAGE GLUCOSE: 120 MG/DL
HBA1C MFR BLD HPLC: 5.8 %
HCT VFR BLD CALC: 41.2 %
HGB BLD-MCNC: 13.6 G/DL
IMM GRANULOCYTES NFR BLD AUTO: 4.8 %
LYMPHOCYTES # BLD AUTO: 2 K/UL
LYMPHOCYTES NFR BLD AUTO: 19.8 %
MAN DIFF?: NORMAL
MCHC RBC-ENTMCNC: 31.4 PG
MCHC RBC-ENTMCNC: 33 GM/DL
MCV RBC AUTO: 95.2 FL
MONOCYTES # BLD AUTO: 0.95 K/UL
MONOCYTES NFR BLD AUTO: 9.4 %
NEUTROPHILS # BLD AUTO: 6.47 K/UL
NEUTROPHILS NFR BLD AUTO: 64 %
PLATELET # BLD AUTO: 199 K/UL
RBC # BLD: 4.33 M/UL
RBC # FLD: 14.5 %
WBC # FLD AUTO: 10.1 K/UL

## 2023-01-03 ENCOUNTER — APPOINTMENT (OUTPATIENT)
Dept: MATERNAL FETAL MEDICINE | Facility: CLINIC | Age: 33
End: 2023-01-03
Payer: MEDICAID

## 2023-01-03 ENCOUNTER — ASOB RESULT (OUTPATIENT)
Age: 33
End: 2023-01-03

## 2023-01-03 PROCEDURE — G0108 DIAB MANAGE TRN  PER INDIV: CPT | Mod: 95

## 2023-01-06 ENCOUNTER — ASOB RESULT (OUTPATIENT)
Age: 33
End: 2023-01-06

## 2023-01-06 ENCOUNTER — APPOINTMENT (OUTPATIENT)
Dept: ANTEPARTUM | Facility: CLINIC | Age: 33
End: 2023-01-06
Payer: MEDICAID

## 2023-01-06 PROCEDURE — 76816 OB US FOLLOW-UP PER FETUS: CPT

## 2023-01-06 PROCEDURE — 76818 FETAL BIOPHYS PROFILE W/NST: CPT | Mod: 59

## 2023-01-10 NOTE — OB RN TRIAGE NOTE - PAIN RATING/NUMBER SCALE (0-10): REST
You can access the FollowMyHealth Patient Portal offered by [facility] by registering at the following website: [email or web address]. By joining [employer]’s FollowMyHealth portal, you will also be able to view your health information using other applications (apps) compatible with our system.
8

## 2023-01-12 ENCOUNTER — NON-APPOINTMENT (OUTPATIENT)
Age: 33
End: 2023-01-12

## 2023-01-12 ENCOUNTER — APPOINTMENT (OUTPATIENT)
Dept: OBGYN | Facility: CLINIC | Age: 33
End: 2023-01-12
Payer: MEDICAID

## 2023-01-12 VITALS — SYSTOLIC BLOOD PRESSURE: 117 MMHG | HEIGHT: 69 IN | DIASTOLIC BLOOD PRESSURE: 74 MMHG | HEART RATE: 85 BPM

## 2023-01-12 PROCEDURE — 0502F SUBSEQUENT PRENATAL CARE: CPT

## 2023-01-13 ENCOUNTER — ASOB RESULT (OUTPATIENT)
Age: 33
End: 2023-01-13

## 2023-01-13 ENCOUNTER — APPOINTMENT (OUTPATIENT)
Dept: ANTEPARTUM | Facility: CLINIC | Age: 33
End: 2023-01-13
Payer: MEDICAID

## 2023-01-13 PROCEDURE — 59025 FETAL NON-STRESS TEST: CPT

## 2023-01-17 ENCOUNTER — APPOINTMENT (OUTPATIENT)
Dept: MATERNAL FETAL MEDICINE | Facility: CLINIC | Age: 33
End: 2023-01-17
Payer: MEDICAID

## 2023-01-17 ENCOUNTER — ASOB RESULT (OUTPATIENT)
Age: 33
End: 2023-01-17

## 2023-01-17 PROCEDURE — G0108 DIAB MANAGE TRN  PER INDIV: CPT | Mod: 95

## 2023-01-20 ENCOUNTER — APPOINTMENT (OUTPATIENT)
Dept: ANTEPARTUM | Facility: CLINIC | Age: 33
End: 2023-01-20
Payer: MEDICAID

## 2023-01-20 ENCOUNTER — ASOB RESULT (OUTPATIENT)
Age: 33
End: 2023-01-20

## 2023-01-20 PROCEDURE — 76818 FETAL BIOPHYS PROFILE W/NST: CPT

## 2023-01-27 ENCOUNTER — APPOINTMENT (OUTPATIENT)
Dept: ANTEPARTUM | Facility: CLINIC | Age: 33
End: 2023-01-27
Payer: MEDICAID

## 2023-01-27 ENCOUNTER — ASOB RESULT (OUTPATIENT)
Age: 33
End: 2023-01-27

## 2023-01-27 PROCEDURE — 76818 FETAL BIOPHYS PROFILE W/NST: CPT

## 2023-01-31 ENCOUNTER — ASOB RESULT (OUTPATIENT)
Age: 33
End: 2023-01-31

## 2023-01-31 ENCOUNTER — APPOINTMENT (OUTPATIENT)
Dept: ANTEPARTUM | Facility: CLINIC | Age: 33
End: 2023-01-31
Payer: MEDICAID

## 2023-01-31 PROCEDURE — 76818 FETAL BIOPHYS PROFILE W/NST: CPT

## 2023-01-31 RX ORDER — INSULIN LISPRO 100 U/ML
100 INJECTION, SOLUTION SUBCUTANEOUS
Qty: 1 | Refills: 3 | Status: ACTIVE | COMMUNITY
Start: 2022-12-16 | End: 1900-01-01

## 2023-01-31 RX ORDER — INSULIN HUMAN 100 [IU]/ML
100 INJECTION, SUSPENSION SUBCUTANEOUS
Qty: 1 | Refills: 2 | Status: ACTIVE | COMMUNITY
Start: 2022-12-16 | End: 1900-01-01

## 2023-02-01 ENCOUNTER — RX RENEWAL (OUTPATIENT)
Age: 33
End: 2023-02-01

## 2023-02-02 ENCOUNTER — NON-APPOINTMENT (OUTPATIENT)
Age: 33
End: 2023-02-02

## 2023-02-02 ENCOUNTER — APPOINTMENT (OUTPATIENT)
Dept: OBGYN | Facility: CLINIC | Age: 33
End: 2023-02-02
Payer: MEDICAID

## 2023-02-02 VITALS
DIASTOLIC BLOOD PRESSURE: 75 MMHG | BODY MASS INDEX: 38.21 KG/M2 | WEIGHT: 258 LBS | SYSTOLIC BLOOD PRESSURE: 114 MMHG | HEIGHT: 69 IN

## 2023-02-02 DIAGNOSIS — Z34.93 ENCOUNTER FOR SUPERVISION OF NORMAL PREGNANCY, UNSPECIFIED, THIRD TRIMESTER: ICD-10-CM

## 2023-02-02 PROCEDURE — 0502F SUBSEQUENT PRENATAL CARE: CPT

## 2023-02-02 RX ORDER — PRENATAL VIT NO.130/IRON/FOLIC 27MG-0.8MG
27-0.8 TABLET ORAL DAILY
Qty: 30 | Refills: 6 | Status: ACTIVE | COMMUNITY
Start: 2022-08-11 | End: 1900-01-01

## 2023-02-03 ENCOUNTER — APPOINTMENT (OUTPATIENT)
Dept: ANTEPARTUM | Facility: CLINIC | Age: 33
End: 2023-02-03
Payer: MEDICAID

## 2023-02-03 ENCOUNTER — ASOB RESULT (OUTPATIENT)
Age: 33
End: 2023-02-03

## 2023-02-03 ENCOUNTER — OUTPATIENT (OUTPATIENT)
Dept: INPATIENT UNIT | Facility: HOSPITAL | Age: 33
LOS: 1 days | Discharge: HOME | End: 2023-02-03
Payer: MEDICAID

## 2023-02-03 ENCOUNTER — APPOINTMENT (OUTPATIENT)
Dept: ANTEPARTUM | Facility: CLINIC | Age: 33
End: 2023-02-03

## 2023-02-03 ENCOUNTER — TRANSCRIPTION ENCOUNTER (OUTPATIENT)
Age: 33
End: 2023-02-03

## 2023-02-03 VITALS
RESPIRATION RATE: 21 BRPM | DIASTOLIC BLOOD PRESSURE: 71 MMHG | HEART RATE: 86 BPM | SYSTOLIC BLOOD PRESSURE: 133 MMHG | TEMPERATURE: 98 F

## 2023-02-03 VITALS — HEART RATE: 78 BPM | DIASTOLIC BLOOD PRESSURE: 60 MMHG | SYSTOLIC BLOOD PRESSURE: 126 MMHG

## 2023-02-03 DIAGNOSIS — Z90.89 ACQUIRED ABSENCE OF OTHER ORGANS: Chronic | ICD-10-CM

## 2023-02-03 DIAGNOSIS — O26.899 OTHER SPECIFIED PREGNANCY RELATED CONDITIONS, UNSPECIFIED TRIMESTER: ICD-10-CM

## 2023-02-03 LAB
BASOPHILS # BLD AUTO: 0.07 K/UL
BASOPHILS NFR BLD AUTO: 0.9 %
EOSINOPHIL # BLD AUTO: 0.1 K/UL
EOSINOPHIL NFR BLD AUTO: 1.3 %
HCT VFR BLD CALC: 43.4 %
HGB BLD-MCNC: 14.7 G/DL
HIV1+2 AB SPEC QL IA.RAPID: NONREACTIVE
IMM GRANULOCYTES NFR BLD AUTO: 3.9 %
LYMPHOCYTES # BLD AUTO: 1.42 K/UL
LYMPHOCYTES NFR BLD AUTO: 18.1 %
MAN DIFF?: NORMAL
MCHC RBC-ENTMCNC: 31.5 PG
MCHC RBC-ENTMCNC: 33.9 GM/DL
MCV RBC AUTO: 92.9 FL
MONOCYTES # BLD AUTO: 0.84 K/UL
MONOCYTES NFR BLD AUTO: 10.7 %
NEUTROPHILS # BLD AUTO: 5.11 K/UL
NEUTROPHILS NFR BLD AUTO: 65.1 %
PLATELET # BLD AUTO: 207 K/UL
RBC # BLD: 4.67 M/UL
RBC # FLD: 13.9 %
WBC # FLD AUTO: 7.85 K/UL

## 2023-02-03 PROCEDURE — 59025 FETAL NON-STRESS TEST: CPT | Mod: 26

## 2023-02-03 PROCEDURE — 76818 FETAL BIOPHYS PROFILE W/NST: CPT

## 2023-02-03 PROCEDURE — 76816 OB US FOLLOW-UP PER FETUS: CPT

## 2023-02-03 PROCEDURE — 99221 1ST HOSP IP/OBS SF/LOW 40: CPT | Mod: 25

## 2023-02-03 NOTE — OB PROVIDER TRIAGE NOTE - NSHPPHYSICALEXAM_GEN_ALL_CORE
ICU Vital Signs Last 24 Hrs  T(C): 36.5 (03 Feb 2023 12:54), Max: 36.5 (03 Feb 2023 12:54)  T(F): 97.7 (03 Feb 2023 12:54), Max: 97.7 (03 Feb 2023 12:54)  HR: 100 (03 Feb 2023 13:26) (86 - 100)  BP: 123/72 (03 Feb 2023 13:26) (123/72 - 133/71)  RR: 21 (03 Feb 2023 12:54) (21 - 21)      – PE:   CV: RRR  Pulm: breathing comfortably on RA  Abd: soft, gravid, nontender  Extr: moving all extremities with ease  FHT: baseline 152, moderate variability, +accels, -decels  [place]: uterine irritability

## 2023-02-03 NOTE — OB PROVIDER TRIAGE NOTE - NSOBPROVIDERNOTE_OBGYN_ALL_OB_FT
33y  @36.3w female with NRFHT at ATU, discharged with reactive fetal heart tracing and pt endorses fetal movement.    - Discussed with Dr. Hui  - Patient to be discharged home with follow up and return precautions  - Please follow up with your obstetrician at your next scheduled appointment.   - Please return for decreased / no fetal movement, vaginal bleeding similar to that of a period, leaking / gush of fluid, regular contractions occurring 4-5 minutes  for one hour or requiring pain medication   - Patient and partner and educated of plan and demonstrate understanding. All questions answered. Discharge instructions provided and signed.

## 2023-02-03 NOTE — OB PROVIDER TRIAGE NOTE - HISTORY OF PRESENT ILLNESS
Dr. Quarles's pt is a 33y  female, EGA@ 36.3  weeks, presents for NRFHT in ATU. Patient denies contractions, reports  + fetal movements,  denies leaking of fluid, denies vaginal bleeding. Pt denies any other concerns.  Antepartum course is significant for:  - GDMA2, uses Humulin 16 units QHS and Admelog 8 units q AC  - Obesity   GBS unknown  OB hx: denies  GYN hx:   - Fibroids, unknown size and location  - Ovarian cysts  Denies hx abnormal Pap smears, STIs, polyps  Med hx:  - Asthma, uses albuterol inhaler prn, hospitalized as child, never intubated  Surg hx:   - Tonsillectomy at 15 years old  Psych hx: denies depression and anxiety   Social hx: denies ETOH, smoking, drugs. Safe at home/in relationship.    Meds: PNV, pepcid, albuterol inhaler, Humulin, Admelog   No Known Allergies                   Dr. Quarles's pt is a 33y  female, EGA@ 36.3 weeks, presents for NRFHT in ATU. Patient denies contractions, reports  + fetal movements,  denies leaking of fluid, denies vaginal bleeding. Pt denies any other concerns.  Antepartum course is significant for:  - GDMA2, uses Humulin 16 units QHS and Admelog 8 units q AC  - Obesity   - Reports fall with impact to abdomen at 8w, presented to [facility] triage, discharged with labor precautions and BPP   GBS unknown  OB hx: denies  GYN hx:   - Fibroids, unknown size and location  - Ovarian cysts  Denies hx abnormal Pap smears, STIs, polyps  Med hx:  - Asthma, uses albuterol inhaler prn, hospitalized as child, never intubated  Surg hx:   - Tonsillectomy at 15 years old  Psych hx: denies depression and anxiety   Social hx: denies ETOH, smoking, drugs. Safe at home/in relationship.    Meds: PNV, pepcid, albuterol inhaler, Humulin, Admelog   No Known Allergies

## 2023-02-03 NOTE — OB PROVIDER TRIAGE NOTE - NS_SONODONE_OBGYN_ALL_OB
EEG complete. Dr. Woodard paged  
Physical Medicine & Rehabilitation    Consult request rec'd, Will proceed with physiatry evaluation when work-up and therapy evals completed. Pt's insurance requires prior authorization for IRP & VERO.    Vane Hook, OT  Rehab Referrals Coordinator  [facility]  [phone]      
No

## 2023-02-03 NOTE — OB RN TRIAGE NOTE - FALL HARM RISK - RISK INTERVENTIONS

## 2023-02-05 LAB
GP B STREP DNA SPEC QL NAA+PROBE: DETECTED
SOURCE GBS: NORMAL

## 2023-02-06 DIAGNOSIS — O99.213 OBESITY COMPLICATING PREGNANCY, THIRD TRIMESTER: ICD-10-CM

## 2023-02-06 DIAGNOSIS — O36.8130 DECREASED FETAL MOVEMENTS, THIRD TRIMESTER, NOT APPLICABLE OR UNSPECIFIED: ICD-10-CM

## 2023-02-06 DIAGNOSIS — N83.209 UNSPECIFIED OVARIAN CYST, UNSPECIFIED SIDE: ICD-10-CM

## 2023-02-06 DIAGNOSIS — O24.414 GESTATIONAL DIABETES MELLITUS IN PREGNANCY, INSULIN CONTROLLED: ICD-10-CM

## 2023-02-06 DIAGNOSIS — O99.891 OTHER SPECIFIED DISEASES AND CONDITIONS COMPLICATING PREGNANCY: ICD-10-CM

## 2023-02-06 DIAGNOSIS — E66.9 OBESITY, UNSPECIFIED: ICD-10-CM

## 2023-02-06 DIAGNOSIS — J45.909 UNSPECIFIED ASTHMA, UNCOMPLICATED: ICD-10-CM

## 2023-02-06 DIAGNOSIS — O99.513 DISEASES OF THE RESPIRATORY SYSTEM COMPLICATING PREGNANCY, THIRD TRIMESTER: ICD-10-CM

## 2023-02-06 DIAGNOSIS — Z3A.36 36 WEEKS GESTATION OF PREGNANCY: ICD-10-CM

## 2023-02-06 DIAGNOSIS — D21.9 BENIGN NEOPLASM OF CONNECTIVE AND OTHER SOFT TISSUE, UNSPECIFIED: ICD-10-CM

## 2023-02-07 ENCOUNTER — ASOB RESULT (OUTPATIENT)
Age: 33
End: 2023-02-07

## 2023-02-07 ENCOUNTER — APPOINTMENT (OUTPATIENT)
Dept: ANTEPARTUM | Facility: CLINIC | Age: 33
End: 2023-02-07
Payer: MEDICAID

## 2023-02-07 ENCOUNTER — OUTPATIENT (OUTPATIENT)
Dept: INPATIENT UNIT | Facility: HOSPITAL | Age: 33
LOS: 1 days | Discharge: HOME | End: 2023-02-07
Payer: MEDICAID

## 2023-02-07 VITALS — HEART RATE: 73 BPM | OXYGEN SATURATION: 98 %

## 2023-02-07 VITALS
HEART RATE: 77 BPM | TEMPERATURE: 98 F | DIASTOLIC BLOOD PRESSURE: 73 MMHG | OXYGEN SATURATION: 99 % | RESPIRATION RATE: 16 BRPM | SYSTOLIC BLOOD PRESSURE: 130 MMHG

## 2023-02-07 DIAGNOSIS — Z90.89 ACQUIRED ABSENCE OF OTHER ORGANS: Chronic | ICD-10-CM

## 2023-02-07 LAB
ALBUMIN SERPL ELPH-MCNC: 3.4 G/DL — SIGNIFICANT CHANGE UP (ref 3.3–5)
ALP SERPL-CCNC: 120 U/L — SIGNIFICANT CHANGE UP (ref 40–120)
ALT FLD-CCNC: 16 U/L — SIGNIFICANT CHANGE UP (ref 4–33)
ANION GAP SERPL CALC-SCNC: 10 MMOL/L — SIGNIFICANT CHANGE UP (ref 7–14)
APPEARANCE UR: CLEAR — SIGNIFICANT CHANGE UP
APTT BLD: 26.7 SEC — LOW (ref 27–36.3)
AST SERPL-CCNC: 12 U/L — SIGNIFICANT CHANGE UP (ref 4–32)
BASOPHILS # BLD AUTO: 0.07 K/UL — SIGNIFICANT CHANGE UP (ref 0–0.2)
BASOPHILS NFR BLD AUTO: 0.8 % — SIGNIFICANT CHANGE UP (ref 0–2)
BILIRUB SERPL-MCNC: <0.2 MG/DL — SIGNIFICANT CHANGE UP (ref 0.2–1.2)
BILIRUB UR-MCNC: NEGATIVE — SIGNIFICANT CHANGE UP
BUN SERPL-MCNC: 11 MG/DL — SIGNIFICANT CHANGE UP (ref 7–23)
CALCIUM SERPL-MCNC: 9.4 MG/DL — SIGNIFICANT CHANGE UP (ref 8.4–10.5)
CHLORIDE SERPL-SCNC: 101 MMOL/L — SIGNIFICANT CHANGE UP (ref 98–107)
CO2 SERPL-SCNC: 21 MMOL/L — LOW (ref 22–31)
COLOR SPEC: SIGNIFICANT CHANGE UP
CREAT ?TM UR-MCNC: 31 MG/DL — SIGNIFICANT CHANGE UP
CREAT SERPL-MCNC: 0.6 MG/DL — SIGNIFICANT CHANGE UP (ref 0.5–1.3)
DIFF PNL FLD: NEGATIVE — SIGNIFICANT CHANGE UP
EGFR: 121 ML/MIN/1.73M2 — SIGNIFICANT CHANGE UP
EOSINOPHIL # BLD AUTO: 0.12 K/UL — SIGNIFICANT CHANGE UP (ref 0–0.5)
EOSINOPHIL NFR BLD AUTO: 1.4 % — SIGNIFICANT CHANGE UP (ref 0–6)
FIBRINOGEN PPP-MCNC: 656 MG/DL — HIGH (ref 200–465)
GLUCOSE SERPL-MCNC: 203 MG/DL — HIGH (ref 70–99)
GLUCOSE UR QL: ABNORMAL
HCT VFR BLD CALC: 42.5 % — SIGNIFICANT CHANGE UP (ref 34.5–45)
HGB BLD-MCNC: 14.5 G/DL — SIGNIFICANT CHANGE UP (ref 11.5–15.5)
IANC: 5.28 K/UL — SIGNIFICANT CHANGE UP (ref 1.8–7.4)
IMM GRANULOCYTES NFR BLD AUTO: 2.3 % — HIGH (ref 0–0.9)
INR BLD: 0.95 RATIO — SIGNIFICANT CHANGE UP (ref 0.88–1.16)
KETONES UR-MCNC: NEGATIVE — SIGNIFICANT CHANGE UP
LDH SERPL L TO P-CCNC: 216 U/L — SIGNIFICANT CHANGE UP (ref 135–225)
LEUKOCYTE ESTERASE UR-ACNC: NEGATIVE — SIGNIFICANT CHANGE UP
LYMPHOCYTES # BLD AUTO: 2 K/UL — SIGNIFICANT CHANGE UP (ref 1–3.3)
LYMPHOCYTES # BLD AUTO: 23.8 % — SIGNIFICANT CHANGE UP (ref 13–44)
MCHC RBC-ENTMCNC: 31 PG — SIGNIFICANT CHANGE UP (ref 27–34)
MCHC RBC-ENTMCNC: 34.1 GM/DL — SIGNIFICANT CHANGE UP (ref 32–36)
MCV RBC AUTO: 91 FL — SIGNIFICANT CHANGE UP (ref 80–100)
MONOCYTES # BLD AUTO: 0.74 K/UL — SIGNIFICANT CHANGE UP (ref 0–0.9)
MONOCYTES NFR BLD AUTO: 8.8 % — SIGNIFICANT CHANGE UP (ref 2–14)
NEUTROPHILS # BLD AUTO: 5.28 K/UL — SIGNIFICANT CHANGE UP (ref 1.8–7.4)
NEUTROPHILS NFR BLD AUTO: 62.9 % — SIGNIFICANT CHANGE UP (ref 43–77)
NITRITE UR-MCNC: NEGATIVE — SIGNIFICANT CHANGE UP
NRBC # BLD: 0 /100 WBCS — SIGNIFICANT CHANGE UP (ref 0–0)
NRBC # FLD: 0 K/UL — SIGNIFICANT CHANGE UP (ref 0–0)
PH UR: 6.5 — SIGNIFICANT CHANGE UP (ref 5–8)
PLATELET # BLD AUTO: 181 K/UL — SIGNIFICANT CHANGE UP (ref 150–400)
POTASSIUM SERPL-MCNC: 4.1 MMOL/L — SIGNIFICANT CHANGE UP (ref 3.5–5.3)
POTASSIUM SERPL-SCNC: 4.1 MMOL/L — SIGNIFICANT CHANGE UP (ref 3.5–5.3)
PROT ?TM UR-MCNC: 9 MG/DL — SIGNIFICANT CHANGE UP
PROT ?TM UR-MCNC: 9 MG/DL — SIGNIFICANT CHANGE UP
PROT SERPL-MCNC: 7 G/DL — SIGNIFICANT CHANGE UP (ref 6–8.3)
PROT UR-MCNC: NEGATIVE — SIGNIFICANT CHANGE UP
PROT/CREAT UR-RTO: 0.3 RATIO — HIGH (ref 0–0.2)
PROTHROM AB SERPL-ACNC: 11 SEC — SIGNIFICANT CHANGE UP (ref 10.5–13.4)
RBC # BLD: 4.67 M/UL — SIGNIFICANT CHANGE UP (ref 3.8–5.2)
RBC # FLD: 13.6 % — SIGNIFICANT CHANGE UP (ref 10.3–14.5)
SODIUM SERPL-SCNC: 132 MMOL/L — LOW (ref 135–145)
SP GR SPEC: 1.03 — SIGNIFICANT CHANGE UP (ref 1.01–1.05)
URATE SERPL-MCNC: 3.3 MG/DL — SIGNIFICANT CHANGE UP (ref 2.5–7)
UROBILINOGEN FLD QL: SIGNIFICANT CHANGE UP
WBC # BLD: 8.4 K/UL — SIGNIFICANT CHANGE UP (ref 3.8–10.5)
WBC # FLD AUTO: 8.4 K/UL — SIGNIFICANT CHANGE UP (ref 3.8–10.5)

## 2023-02-07 PROCEDURE — 76818 FETAL BIOPHYS PROFILE W/NST: CPT

## 2023-02-07 PROCEDURE — 59025 FETAL NON-STRESS TEST: CPT | Mod: 26

## 2023-02-07 PROCEDURE — 99221 1ST HOSP IP/OBS SF/LOW 40: CPT | Mod: 25

## 2023-02-07 PROCEDURE — 93010 ELECTROCARDIOGRAM REPORT: CPT

## 2023-02-07 RX ORDER — SODIUM CHLORIDE 9 MG/ML
1000 INJECTION, SOLUTION INTRAVENOUS ONCE
Refills: 0 | Status: COMPLETED | OUTPATIENT
Start: 2023-02-07 | End: 2023-02-07

## 2023-02-07 RX ORDER — FAMOTIDINE 10 MG/ML
0 INJECTION INTRAVENOUS
Qty: 0 | Refills: 0 | DISCHARGE

## 2023-02-07 RX ADMIN — SODIUM CHLORIDE 2000 MILLILITER(S): 9 INJECTION, SOLUTION INTRAVENOUS at 21:28

## 2023-02-07 NOTE — OB PROVIDER TRIAGE NOTE - NSHPLABSRESULTS_GEN_ALL_CORE
CBC Full  -  ( 2023 20:50 )  WBC Count : 8.40 K/uL  RBC Count : 4.67 M/uL  Hemoglobin : 14.5 g/dL  Hematocrit : 42.5 %  Platelet Count - Automated : 181 K/uL  Mean Cell Volume : 91.0 fL  Mean Cell Hemoglobin : 31.0 pg  Mean Cell Hemoglobin Concentration : 34.1 gm/dL  Auto Neutrophil # : 5.28 K/uL  Auto Lymphocyte # : 2.00 K/uL  Auto Monocyte # : 0.74 K/uL  Auto Eosinophil # : 0.12 K/uL  Auto Basophil # : 0.07 K/uL  Auto Neutrophil % : 62.9 %  Auto Lymphocyte % : 23.8 %  Auto Monocyte % : 8.8 %  Auto Eosinophil % : 1.4 %  Auto Basophil % : 0.8 %      132<L>  |  101  |  11  ----------------------------<  203<H>  4.1   |  21<L>  |  0.60    Ca    9.4      2023 20:50    TPro  7.0  /  Alb  3.4  /  TBili  <0.2  /  DBili  x   /  AST  12  /  ALT  16  /  AlkPhos  120  02-07    PT/INR - ( 2023 20:50 )   PT: 11.0 sec;   INR: 0.95 ratio         PTT - ( 2023 20:50 )  PTT:26.7 sec  Fibrinogen 656  Urinalysis Basic - ( 2023 20:50 )    Color: Light Yellow / Appearance: Clear / S.030 / pH: x  Gluc: x / Ketone: Negative  / Bili: Negative / Urobili: <2 mg/dL   Blood: x / Protein: Negative / Nitrite: Negative   Leuk Esterase: Negative / RBC: x / WBC x   Sq Epi: x / Non Sq Epi: x / Bacteria: x    PCR 0.3

## 2023-02-07 NOTE — OB PROVIDER TRIAGE NOTE - ADDITIONAL INSTRUCTIONS
please drink 1-2 litter of fluid per day  follow up with OB for the next schedule appointment  discharged home @ 1735

## 2023-02-07 NOTE — OB PROVIDER TRIAGE NOTE - HISTORY OF PRESENT ILLNESS
36 yo , EGA@ 37 weeks, presented to D&T with c/o contractions irregular, every 10-15 minutes, denies vaginal bleeding, leakage of fluid, and reports fetal movement.  Denies fever, chills, headaches, changes in vision, chest pain, palpitations, shortness of breath, cough, nausea, vomiting, diarrhea, constipation, urinary symptoms, edema.    Prenatal care with Dr. Quarles  Prenatal course is complicated GDMA-2  GBS status is Positive 23.  Patient denies signs and symptoms of COVID 19; denies symptomatic illness; fully vaccinated.    No adverse reactions to anesthesia, no objections to blood transfusions if clinically indicated.  OB hx: primigravida  Med hx: Asthma  Surg hx: tonsillectomy 2010  GYN hx:  fibroid, denies hx of abnormal papsmear/cysts/STDs  Meds: Admolog 8U 3x daily, Humilin 18U Q HS  Allergies: NKDA    Social hx: Denies alcohol, tobacco, drug use  Psych hx: denies hx of anxiety/depression     2 yo , EGA@ 37 weeks, presented to D&T with c/o intermittent Palpitation x 3 days. pt report that she has been having stuffy nose on and off during the pregnancy, denies cough, chest pain, shortness of breath or sore throat.  denies vaginal bleeding, leakage of fluid, and reports fetal movement.  Denies fever, chills, headaches, changes in vision, nausea, vomiting, diarrhea, constipation, urinary symptoms, edema.   Schedule for IOL on 23  Prenatal care with Dr. Quarles  Prenatal course is complicated GDMA-2  GBS status is Positive 23.  Patient denies signs and symptoms of COVID 19; denies symptomatic illness; fully vaccinated.    No adverse reactions to anesthesia, no objections to blood transfusions if clinically indicated.  OB hx: primigravida  Med hx: Asthma  Surg hx: tonsillectomy   GYN hx:  fibroid, denies hx of abnormal papsmear/cysts/STDs  Meds: Admolog 8U 3x daily, Humilin 18U Q HS  Allergies: NKDA    Social hx: smoke [place] prior pregnancy  Psych hx: denies hx of anxiety/depression

## 2023-02-07 NOTE — OB PROVIDER TRIAGE NOTE - NSHPPHYSICALEXAM_GEN_ALL_CORE
Vital Signs Last 24 Hrs  T(C): 36.8 (07 Feb 2023 20:35), Max: 36.8 (07 Feb 2023 20:35)  T(F): 98.24 (07 Feb 2023 20:35), Max: 98.24 (07 Feb 2023 20:35)  HR: 77 (07 Feb 2023 21:00) (71 - 80)  BP: 127/64 (07 Feb 2023 20:55) (127/64 - 146/63)  RR: 16 (07 Feb 2023 18:47) (16 - 16)  SpO2: 98% (07 Feb 2023 21:00) (95% - 99%)    Gen: NAD  Head: NC/AT  Cardio: S1S2+, RRR  Resp: CTABL, no wheezing  Abdomen: Soft, NT/ND, BS+  Extremities: No LE edema bilaterally    NST and BPP done to assess fetal surveillance and results as follows:  NST-->FHR: 155 HR baseline, moderate variability, accelerations present, no decelerations, Category 1.  [place]: Contractions present, irregular,  TAUS:    Plan:   EKG.   HELLP Lab. Vital Signs Last 24 Hrs  T(C): 36.8 (07 Feb 2023 20:35), Max: 36.8 (07 Feb 2023 18:47)  T(F): 98.24 (07 Feb 2023 20:35), Max: 98.24 (07 Feb 2023 20:35)  HR: 70 (07 Feb 2023 22:37) (66 - 88)  BP: 115/64 (07 Feb 2023 22:12) (107/53 - 146/63)  RR: 16 (07 Feb 2023 18:47) (16 - 16)  SpO2: 98% (07 Feb 2023 22:32) (91% - 99%)    Parameters below as of 07 Feb 2023 18:47  Patient On (Oxygen Delivery Method): room air        Gen: NAD  Head: NC/AT  Cardio: S1S2+, RRR  Resp: CTABL, no wheezing  Abdomen: Soft, NT/ND, BS+  Extremities: No LE edema bilaterally    NST and BPP done to assess fetal surveillance and results as follows:  NST-->FHR: 155 HR baseline, moderate variability, accelerations present, no decelerations, Category 1.  [place]: Contractions present, irregular,  TAUS: Cephalic presentation, Anterior placenta, JUANY 18.90. BPP 8/8     Plan:   EKG normal sinus Ryythm, normal EKG  HELLP Lab.  NST/TAUS  continues Pulse oxymetry

## 2023-02-07 NOTE — OB PROVIDER TRIAGE NOTE - NSOBPROVIDERNOTE_OBGYN_ALL_OB_FT
36 yo , EGA@ 37 weeks Palpitation 36 yo , EGA@ 37 weeks Palpitation  2240 discuss with Dr. Cooney   stable for discharge  pt to keep self very well hydrated  S/S of PEC reviewed with patient  URI symptoms reviewed with Patient  Discharge patient home.  Labor precautions and fetal movements count were reviewed; if not in labor, will follow up with OB for the next schedule appointment.  All ordered tests results reviewed and interpreted.  Plan of care was reviewed with patient and family; patient states understanding of the above plan.

## 2023-02-09 ENCOUNTER — APPOINTMENT (OUTPATIENT)
Dept: OBGYN | Facility: CLINIC | Age: 33
End: 2023-02-09
Payer: MEDICAID

## 2023-02-09 VITALS
HEIGHT: 69 IN | SYSTOLIC BLOOD PRESSURE: 120 MMHG | HEART RATE: 79 BPM | BODY MASS INDEX: 39.25 KG/M2 | WEIGHT: 265 LBS | DIASTOLIC BLOOD PRESSURE: 77 MMHG

## 2023-02-09 PROCEDURE — 0502F SUBSEQUENT PRENATAL CARE: CPT

## 2023-02-10 ENCOUNTER — APPOINTMENT (OUTPATIENT)
Dept: ANTEPARTUM | Facility: CLINIC | Age: 33
End: 2023-02-10
Payer: MEDICAID

## 2023-02-10 ENCOUNTER — ASOB RESULT (OUTPATIENT)
Age: 33
End: 2023-02-10

## 2023-02-10 PROCEDURE — 76818 FETAL BIOPHYS PROFILE W/NST: CPT

## 2023-02-12 ENCOUNTER — ASOB RESULT (OUTPATIENT)
Age: 33
End: 2023-02-12

## 2023-02-12 ENCOUNTER — OUTPATIENT (OUTPATIENT)
Dept: OUTPATIENT SERVICES | Facility: HOSPITAL | Age: 33
LOS: 1 days | Discharge: HOME | End: 2023-02-12
Payer: MEDICAID

## 2023-02-12 ENCOUNTER — APPOINTMENT (OUTPATIENT)
Dept: ANTEPARTUM | Facility: CLINIC | Age: 33
End: 2023-02-12
Payer: MEDICAID

## 2023-02-12 ENCOUNTER — INPATIENT (INPATIENT)
Facility: HOSPITAL | Age: 33
LOS: 3 days | Discharge: HOME | End: 2023-02-16
Attending: STUDENT IN AN ORGANIZED HEALTH CARE EDUCATION/TRAINING PROGRAM | Admitting: STUDENT IN AN ORGANIZED HEALTH CARE EDUCATION/TRAINING PROGRAM
Payer: MEDICAID

## 2023-02-12 VITALS
RESPIRATION RATE: 16 BRPM | HEART RATE: 80 BPM | DIASTOLIC BLOOD PRESSURE: 73 MMHG | TEMPERATURE: 98 F | SYSTOLIC BLOOD PRESSURE: 134 MMHG

## 2023-02-12 VITALS — SYSTOLIC BLOOD PRESSURE: 129 MMHG | DIASTOLIC BLOOD PRESSURE: 62 MMHG | HEART RATE: 85 BPM

## 2023-02-12 VITALS
HEART RATE: 100 BPM | DIASTOLIC BLOOD PRESSURE: 69 MMHG | RESPIRATION RATE: 18 BRPM | TEMPERATURE: 98 F | SYSTOLIC BLOOD PRESSURE: 125 MMHG

## 2023-02-12 DIAGNOSIS — Z90.89 ACQUIRED ABSENCE OF OTHER ORGANS: Chronic | ICD-10-CM

## 2023-02-12 DIAGNOSIS — O26.899 OTHER SPECIFIED PREGNANCY RELATED CONDITIONS, UNSPECIFIED TRIMESTER: ICD-10-CM

## 2023-02-12 LAB
BASOPHILS # BLD AUTO: 0.07 K/UL — SIGNIFICANT CHANGE UP (ref 0–0.2)
BASOPHILS NFR BLD AUTO: 0.6 % — SIGNIFICANT CHANGE UP (ref 0–2)
BLD GP AB SCN SERPL QL: NEGATIVE — SIGNIFICANT CHANGE UP
EOSINOPHIL # BLD AUTO: 0.04 K/UL — SIGNIFICANT CHANGE UP (ref 0–0.5)
EOSINOPHIL NFR BLD AUTO: 0.4 % — SIGNIFICANT CHANGE UP (ref 0–6)
GLUCOSE BLDC GLUCOMTR-MCNC: 101 MG/DL — HIGH (ref 70–99)
GLUCOSE BLDC GLUCOMTR-MCNC: 103 MG/DL — HIGH (ref 70–99)
GLUCOSE BLDC GLUCOMTR-MCNC: 120 MG/DL — HIGH (ref 70–99)
GLUCOSE BLDC GLUCOMTR-MCNC: 141 MG/DL — HIGH (ref 70–99)
GLUCOSE BLDC GLUCOMTR-MCNC: 91 MG/DL — SIGNIFICANT CHANGE UP (ref 70–99)
GLUCOSE BLDC GLUCOMTR-MCNC: 99 MG/DL — SIGNIFICANT CHANGE UP (ref 70–99)
HCT VFR BLD CALC: 43.6 % — SIGNIFICANT CHANGE UP (ref 34.5–45)
HGB BLD-MCNC: 14.7 G/DL — SIGNIFICANT CHANGE UP (ref 11.5–15.5)
IANC: 8.75 K/UL — HIGH (ref 1.8–7.4)
IMM GRANULOCYTES NFR BLD AUTO: 0.9 % — SIGNIFICANT CHANGE UP (ref 0–0.9)
LYMPHOCYTES # BLD AUTO: 1.24 K/UL — SIGNIFICANT CHANGE UP (ref 1–3.3)
LYMPHOCYTES # BLD AUTO: 11.1 % — LOW (ref 13–44)
MCHC RBC-ENTMCNC: 31 PG — SIGNIFICANT CHANGE UP (ref 27–34)
MCHC RBC-ENTMCNC: 33.7 GM/DL — SIGNIFICANT CHANGE UP (ref 32–36)
MCV RBC AUTO: 92 FL — SIGNIFICANT CHANGE UP (ref 80–100)
MONOCYTES # BLD AUTO: 0.94 K/UL — HIGH (ref 0–0.9)
MONOCYTES NFR BLD AUTO: 8.4 % — SIGNIFICANT CHANGE UP (ref 2–14)
NEUTROPHILS # BLD AUTO: 8.75 K/UL — HIGH (ref 1.8–7.4)
NEUTROPHILS NFR BLD AUTO: 78.6 % — HIGH (ref 43–77)
NRBC # BLD: 0 /100 WBCS — SIGNIFICANT CHANGE UP (ref 0–0)
NRBC # FLD: 0 K/UL — SIGNIFICANT CHANGE UP (ref 0–0)
PLATELET # BLD AUTO: 189 K/UL — SIGNIFICANT CHANGE UP (ref 150–400)
RBC # BLD: 4.74 M/UL — SIGNIFICANT CHANGE UP (ref 3.8–5.2)
RBC # FLD: 13.7 % — SIGNIFICANT CHANGE UP (ref 10.3–14.5)
RH IG SCN BLD-IMP: POSITIVE — SIGNIFICANT CHANGE UP
SARS-COV-2 RNA SPEC QL NAA+PROBE: SIGNIFICANT CHANGE UP
WBC # BLD: 11.14 K/UL — HIGH (ref 3.8–10.5)
WBC # FLD AUTO: 11.14 K/UL — HIGH (ref 3.8–10.5)

## 2023-02-12 PROCEDURE — 59025 FETAL NON-STRESS TEST: CPT | Mod: 26

## 2023-02-12 PROCEDURE — 76815 OB US LIMITED FETUS(S): CPT | Mod: 26

## 2023-02-12 PROCEDURE — 76819 FETAL BIOPHYS PROFIL W/O NST: CPT | Mod: 26

## 2023-02-12 PROCEDURE — 99212 OFFICE O/P EST SF 10 MIN: CPT | Mod: 25

## 2023-02-12 RX ORDER — INSULIN HUMAN 100 [IU]/ML
1 INJECTION, SOLUTION SUBCUTANEOUS
Qty: 100 | Refills: 0 | Status: DISCONTINUED | OUTPATIENT
Start: 2023-02-12 | End: 2023-02-13

## 2023-02-12 RX ORDER — SODIUM CHLORIDE 9 MG/ML
1000 INJECTION, SOLUTION INTRAVENOUS
Refills: 0 | Status: DISCONTINUED | OUTPATIENT
Start: 2023-02-12 | End: 2023-02-13

## 2023-02-12 RX ORDER — SODIUM CHLORIDE 9 MG/ML
1000 INJECTION INTRAMUSCULAR; INTRAVENOUS; SUBCUTANEOUS
Refills: 0 | Status: DISCONTINUED | OUTPATIENT
Start: 2023-02-12 | End: 2023-02-13

## 2023-02-12 RX ORDER — AMPICILLIN TRIHYDRATE 250 MG
2 CAPSULE ORAL ONCE
Refills: 0 | Status: COMPLETED | OUTPATIENT
Start: 2023-02-12 | End: 2023-02-12

## 2023-02-12 RX ORDER — DEXTROSE 50 % IN WATER 50 %
15 SYRINGE (ML) INTRAVENOUS ONCE
Refills: 0 | Status: DISCONTINUED | OUTPATIENT
Start: 2023-02-12 | End: 2023-02-15

## 2023-02-12 RX ORDER — ALBUTEROL 90 UG/1
2 AEROSOL, METERED ORAL EVERY 6 HOURS
Refills: 0 | Status: DISCONTINUED | OUTPATIENT
Start: 2023-02-12 | End: 2023-02-16

## 2023-02-12 RX ORDER — INSULIN HUMAN 100 [IU]/ML
1 INJECTION, SOLUTION SUBCUTANEOUS ONCE
Refills: 0 | Status: DISCONTINUED | OUTPATIENT
Start: 2023-02-12 | End: 2023-02-12

## 2023-02-12 RX ORDER — OXYTOCIN 10 UNIT/ML
333.33 VIAL (ML) INJECTION
Qty: 20 | Refills: 0 | Status: DISCONTINUED | OUTPATIENT
Start: 2023-02-12 | End: 2023-02-13

## 2023-02-12 RX ORDER — ALBUTEROL 90 UG/1
2 AEROSOL, METERED ORAL EVERY 6 HOURS
Refills: 0 | Status: DISCONTINUED | OUTPATIENT
Start: 2023-02-12 | End: 2023-02-12

## 2023-02-12 RX ORDER — DEXTROSE 50 % IN WATER 50 %
50 SYRINGE (ML) INTRAVENOUS
Refills: 0 | Status: DISCONTINUED | OUTPATIENT
Start: 2023-02-12 | End: 2023-02-15

## 2023-02-12 RX ORDER — OXYTOCIN 10 UNIT/ML
2 VIAL (ML) INJECTION
Qty: 30 | Refills: 0 | Status: DISCONTINUED | OUTPATIENT
Start: 2023-02-12 | End: 2023-02-13

## 2023-02-12 RX ORDER — CHLORHEXIDINE GLUCONATE 213 G/1000ML
1 SOLUTION TOPICAL ONCE
Refills: 0 | Status: DISCONTINUED | OUTPATIENT
Start: 2023-02-12 | End: 2023-02-13

## 2023-02-12 RX ORDER — DEXTROSE 50 % IN WATER 50 %
25 SYRINGE (ML) INTRAVENOUS
Refills: 0 | Status: DISCONTINUED | OUTPATIENT
Start: 2023-02-12 | End: 2023-02-15

## 2023-02-12 RX ORDER — AMPICILLIN TRIHYDRATE 250 MG
1 CAPSULE ORAL EVERY 4 HOURS
Refills: 0 | Status: DISCONTINUED | OUTPATIENT
Start: 2023-02-12 | End: 2023-02-13

## 2023-02-12 RX ORDER — INSULIN HUMAN 100 [IU]/ML
1.5 INJECTION, SOLUTION SUBCUTANEOUS
Qty: 100 | Refills: 0 | Status: DISCONTINUED | OUTPATIENT
Start: 2023-02-12 | End: 2023-02-13

## 2023-02-12 RX ORDER — INFLUENZA VIRUS VACCINE 15; 15; 15; 15 UG/.5ML; UG/.5ML; UG/.5ML; UG/.5ML
0.5 SUSPENSION INTRAMUSCULAR ONCE
Refills: 0 | Status: DISCONTINUED | OUTPATIENT
Start: 2023-02-12 | End: 2023-02-16

## 2023-02-12 RX ORDER — CITRIC ACID/SODIUM CITRATE 300-500 MG
15 SOLUTION, ORAL ORAL EVERY 6 HOURS
Refills: 0 | Status: DISCONTINUED | OUTPATIENT
Start: 2023-02-12 | End: 2023-02-13

## 2023-02-12 RX ADMIN — SODIUM CHLORIDE 125 MILLILITER(S): 9 INJECTION, SOLUTION INTRAVENOUS at 18:15

## 2023-02-12 RX ADMIN — Medication 2 MILLIUNIT(S)/MIN: at 19:50

## 2023-02-12 RX ADMIN — Medication 200 GRAM(S): at 16:45

## 2023-02-12 RX ADMIN — Medication 108 GRAM(S): at 20:24

## 2023-02-12 RX ADMIN — INSULIN HUMAN 1 UNIT(S)/HR: 100 INJECTION, SOLUTION SUBCUTANEOUS at 21:33

## 2023-02-12 NOTE — OB RN TRIAGE NOTE - FALL HARM RISK - UNIVERSAL INTERVENTIONS
Bed in lowest position, wheels locked, appropriate side rails in place/Call bell, personal items and telephone in reach/Instruct patient to call for assistance before getting out of bed or chair/Non-slip footwear when patient is out of bed/[place] to call system/Physically safe environment - no spills, clutter or unnecessary equipment/Purposeful Proactive Rounding/Room/bathroom lighting operational, light cord in reach

## 2023-02-12 NOTE — OB RN PATIENT PROFILE - DOES PATIENT HAVE ADVANCE DIRECTIVE
Yes 41-year-old female with past medical history nephrectomy, donated 1 kidney, pre-dm on metoformin, (+) COVID approximately 14 days ago, presents with right upper quadrant pain since 7 PM, sharp, constant, nonradiating, no alleviating or precipitating factors, moderate in intensity.  Mother gave patient gas medication, Pepcid, Pepto-Bismol, with no relief.  No fever, chills, n/v, cp,  pleuritic cp, sob, palpitations, diaphoresis, cough, ha/lh/dizziness, numbness/tingling, neck pain/ stiffness, diarrhea, constipation, melena/brbpr, urinary symptoms, vaginal bleeding/discharge/odor, trauma, weakness, edema, calf pain/swelling/erythema, sick contacts, recent travel or rash.     On Exam: Vital Signs: I have reviewed the initial vital signs. Constitutional: Non toxic appeating pt sitting on stretcher speaking full sentences. Integumentary: No rash. No jaundice. EYES: No sclera Icterus. ENT: MMM NECK: Supple, non-tender, no meningeal signs. Cardiovascular: RRR, radial pulses 2/4 b/l. No JVD. Respiratory: BS present b/l, ctabl, no wheezing or crackles, no accessory muscle use, no stridor. Gastrointestinal: BS present throughout all 4 quadrants, soft, nd, RUQ and epigastric pain to palpation, no rebound tenderness or guarding, no cvat. (+) Valdes's (-) Rovsing (-) Obturator (-) Psoas, Musculoskeletal: FROM, no edema, no calf pain/swelling/erythema. Neurologic: AAOx3, motor 5/5 and sensation intact throughout upper and lower ext, CN II-XII intact, No facial droop or slurring of speech. No focal deficits.

## 2023-02-12 NOTE — OB PROVIDER TRIAGE NOTE - LABOR: CERVICAL DILATION
Hospital Medicine History & Physical Note    Date of Service  7/25/2019    Primary Care Physician  Wily Garcia M.D.    Consultants  Gastroenterology     Code Status  DNR / DNI -personally discussed with patient, spouse.  They verbalized understanding of the CODE STATUS, DNR/DNI in line with patient wishes.    Chief Complaint  GI Bleeding     History of Presenting Illness  67 y.o. female who presented 7/25/2019 with GI Bleeding.     Patient with underlying history of Alzheimer's dementia, known history of previous GI bleedings, rheumatoid arthritis, history of melanoma, degenerative joint disease, hypothyroidism.  She has required admissions previously to the hospital in 2016 and 2018 for GI bleeding.  No history of pandiverticulosis.  Previously upper endoscopy and colonoscopy in 2016 with Dr. Akers [facility].  At that time, her upper endoscopy has revealed nonerosive gastritis, duodenitis and a posterior's wall duodenal ulcer without any blood in the upper GI tract.  Sigmoidoscopy with findings of pandiverticulosis and blood in the sigmoid colon.    Today patient presents to the emergency department with complaints of rectal bleeding.  This started yesterday, initially it was mild, minimal amounts of blood in bowel movements, no preceding melena.  No abdominal pain.  Subsequently, last night patient had 3 episodes of profuse diarrhea, ena bleeding/gastric bleeding which remained painless.  Subsequent lightheadedness, dizziness and patient presented to the emergency department for further evaluation.  Denies use of antiplatelet therapy.  No use of NSAIDs at home.  Uses only Tylenol for pain if needed.  No anticoagulation use.  No alcohol/drug use.  Upon evaluation by me, patient is aware of being at [facility], where of the month being July, unaware of the exact date, year and aware of the president of the country.  She is verbal, pale-appearing and wants to discharge home as  opposed to being admitted.  Discussed with spouse, agreeable to hospitalization at this time.  At this time patient does not have any further bleeding.  She denies any abdominal pain.  She denies fever or chills.  No headaches.  Denies chest pain/shortness of breath.    Review of Systems  Review of Systems   Constitutional: Positive for malaise/fatigue. Negative for chills, diaphoresis, fever and weight loss.   HENT: Negative for hearing loss and tinnitus.    Eyes: Negative for blurred vision and double vision.   Respiratory: Negative for cough, hemoptysis, sputum production, shortness of breath and wheezing.    Cardiovascular: Negative for chest pain, palpitations, orthopnea, claudication, leg swelling and PND.   Gastrointestinal: Positive for blood in stool. Negative for abdominal pain, constipation, diarrhea, heartburn, melena, nausea and vomiting.   Genitourinary: Negative for dysuria, flank pain, frequency, hematuria and urgency.   Musculoskeletal: Negative for back pain, falls, joint pain, myalgias and neck pain.   Skin: Negative for itching and rash.   Neurological: Positive for dizziness and weakness. Negative for tingling, tremors, sensory change, speech change, focal weakness, seizures, loss of consciousness and headaches.   Psychiatric/Behavioral: Positive for memory loss. Negative for depression. The patient does not have insomnia.        Past Medical History   has a past medical history of Alzheimer's disease; Alzheimer's disease; Anesthesia; Arthritis; Backpain; Cancer (HCC) (2010); Hypothyroid; Other specified symptom associated with female genital organs; Pain; Renal disorder; Unspecified disorder of thyroid; and Unspecified urinary incontinence. She also has no past medical history of Breast cancer ([facility]).    Surgical History   has a past surgical history that includes scleral buckling (7/9/08); rectus repair (10/9/08); hernia repair; inguinal hernia repair (12/18/2008); laparoscopy (5/7/2009);  femoral hernia repair (5/7/2009); lumbar fusion posterior (2/27/2013); lumbar laminectomy diskectomy (2/27/2013); gyn surgery; vaginectomy (7/25/2011); gyn surgery; gyn surgery; knee arthroplasty total (Right, 7/11/2016); and gastroscopy-endo (8/11/2016).     Family History  Negative reviewed     Social History   reports that she has never smoked. She has never used smokeless tobacco. She reports that she does not drink alcohol or use drugs.    Allergies  Allergies   Allergen Reactions   • Nkda [No Known Drug Allergy]        Medications  Prior to Admission Medications   Prescriptions Last Dose Informant Patient Reported? Taking?   B Complex Vitamins (VITAMIN B COMPLEX PO) 7/24/2019 at PM Patient Yes No   Sig: Take 1 Tab by mouth every evening.   CALCIUM-MAGNESIUM PO 7/24/2019 at PM Patient Yes No   Sig: Take 1 Tab by mouth every evening. 1000/500mg   Coenzyme Q10 (CO Q 10) 100 MG Cap 7/24/2019 at AM Patient Yes No   Sig: Take 200 mg by mouth every morning.   Melatonin 10 MG Tab 7/24/2019 at PM Patient Yes No   Sig: Take 10 mg by mouth every evening.   NON SPECIFIED 7/24/2019 at AM Patient Yes No   Sig: Take 1 Cap by mouth every day. Blueberry extract    Potassium 99 MG Tab 7/24/2019 at PM Patient Yes No   Sig: Take 99 mg by mouth every evening.   Probiotic Product (PROBIOTIC DAILY PO) 7/24/2019 at AM Patient Yes No   Sig: Take 1 Cap by mouth every morning.   Vitamins A & D (VITAMIN A & D) 5000-400 UNITS Cap 7/24/2019 at PM Patient Yes No   Sig: Take 1 Tab by mouth every evening.   donepezil (ARICEPT) 10 MG tablet 7/24/2019 at PM Patient No No   Sig: Take 1 Tab by mouth every evening.   levothyroxine (SYNTHROID) 88 MCG Tab 7/24/2019 at AM Patient Yes No   Sig: Take 88 mcg by mouth Every morning on an empty stomach.   liothyronine (CYTOMEL) 5 MCG Tab 7/24/2019 at AM Patient Yes No   Sig: Take 5 mcg by mouth every day.   vitamin k 100 MCG tablet 7/24/2019 at AM Patient Yes No   Sig: Take 100 mcg by mouth every morning.       Facility-Administered Medications: None       Physical Exam  Temp:  [36.2 °C (97.2 °F)-36.7 °C (98.1 °F)] 36.7 °C (98.1 °F)  Pulse:  [75-79] 75  Resp:  [18] 18  BP: ()/(67) 111/67  SpO2:  [96 %-97 %] 97 %    Physical Exam   Constitutional: She is oriented to person, place, and time. She appears well-developed and well-nourished. No distress.   HENT:   Head: Normocephalic.   Mouth/Throat: Oropharynx is clear and moist. No oropharyngeal exudate.   Eyes: Pupils are equal, round, and reactive to light. No scleral icterus.   Conjunctival pallor   Neck: No JVD present.   Cardiovascular: Normal rate and regular rhythm.  Exam reveals no gallop and no friction rub.    Murmur (apical systolic) heard.  Pulmonary/Chest: Breath sounds normal. No stridor. No respiratory distress. She has no wheezes. She has no rales.   Abdominal: Soft. Bowel sounds are normal. She exhibits no distension. There is no tenderness. There is no rebound and no guarding.   Musculoskeletal: Normal range of motion. She exhibits edema (Chronic edema, seeing vascular surgery in the outpatient setting). She exhibits no tenderness or deformity.   Neurological: She is alert and oriented to person, place, and time. No cranial nerve deficit.   Skin: Skin is warm and dry. She is not diaphoretic. There is pallor.   Psychiatric: She has a normal mood and affect. Her behavior is normal. Judgment and thought content normal.       Laboratory:  Recent Labs      07/25/19   0700   WBC  10.6   RBC  3.30*   HEMOGLOBIN  10.7*   HEMATOCRIT  32.8*   MCV  99.4*   MCH  32.4   MCHC  32.6*   RDW  54.0*   PLATELETCT  277   MPV  10.2     Recent Labs      07/25/19   0700   SODIUM  139   POTASSIUM  3.9   CHLORIDE  111   CO2  18*   GLUCOSE  94   BUN  29*   CREATININE  0.65   CALCIUM  8.5     Recent Labs      07/25/19   0700   ALTSGPT  15   ASTSGOT  14   ALKPHOSPHAT  53   TBILIRUBIN  0.2   GLUCOSE  94     Recent Labs      07/25/19   0700   APTT  24.9   INR  0.97     No  results for input(s): NTPROBNP in the last 72 hours.      No results for input(s): TROPONINT in the last 72 hours.    Urinalysis:    No results found     Imaging:  CTA ABDOMEN PELVIS W & W/O POST PROCESS    (Results Pending)         Assessment/Plan:  I anticipate this patient will require at least two midnights for appropriate medical management, necessitating inpatient admission.    Rectal bleeding- (present on admission)   Assessment & Plan    Painless, gush of bleeding without preceding melena  Patient with prior history of peptic ulcer disease and pandiverticulosis  Painless bleeding, BRBPR concerning for diverticular bleed  Azotemia concerning for upper GI bleed    At this time patient will be admitted to the hospital    I personally discussed the case with Dr. Jackson, emergency room physician  I advised proceeding with CTA of the abdomen to see if the bleeding source can be localized  In addition, I recommended proceeding with consultation from gastroenterology consultants    At this time we will provide the patient with 80 mg of IV Protonix, subsequently maintain on 8 mg/h Protonix infusion  Obtain type and screen, TEG with platelet mapping  Gentle IV fluid hydration, keep patient n.p.o./sips with meds  Every 6 hours hemoglobin monitoring  Monitor Hb / Restrictive transfusion strategy  Maintain 2 large-bore IV access at all times  Need for endoscopic evaluation per gastroenterology team    If orthostatic, de-escalation and vital signs, transfer to the intensive care unit    CODE STATUS discussed with patient, spouse at bedside.  DNR/DNI.     DNR (do not resuscitate)- (present on admission)   Assessment & Plan    DNR/DNI     Dementia- (present on admission)   Assessment & Plan    Known history of  Avoid sedative, narcotics / BZDs as able     Hypothyroid- (present on admission)   Assessment & Plan    Continue at home supplementation of thyroid medications, check TSH         VTE prophylaxis: SCDs   2-3.9 cm

## 2023-02-12 NOTE — OB PROVIDER TRIAGE NOTE - NSHPPHYSICALEXAM_GEN_ALL_CORE
abdomen: soft, nt on palp  SVE: 2.5 /80/-3  TAS: vertex   EFW: 3175 grams  T(C): 36.6 (02-12-23 @ 15:17), Max: 36.8 (02-12-23 @ 09:25)  HR: 100 (02-12-23 @ 15:34) (78 - 100)  BP: 125/69 (02-12-23 @ 15:34) (125/69 - 134/73)  RR: 18 (02-12-23 @ 15:17) (16 - 18)  SpO2: --  cat 1 FHT  toco: every 2 minutes

## 2023-02-12 NOTE — OB RN PATIENT PROFILE - FALL HARM RISK - UNIVERSAL INTERVENTIONS
Bed in lowest position, wheels locked, appropriate side rails in place/Call bell, personal items and telephone in reach/Instruct patient to call for assistance before getting out of bed or chair/Non-slip footwear when patient is out of bed/[place] to call system/Physically safe environment - no spills, clutter or unnecessary equipment/Purposeful Proactive Rounding/Room/bathroom lighting operational, light cord in reach

## 2023-02-12 NOTE — OB RN TRIAGE NOTE - TEMPERATURE IN FAHRENHEIT (DEGREES F)
MRI Spine:  Acute compression fracture of superior T12 vertebral body (25% height loss), with disruption of the ossified anterior longitudinal ligament, slight widening of anterior T11-T12 disc space and T11-T12 interspinous ligament edema/injury. Abnormal fluid signal within anterior and posterior T11-T12 disc, with questionable minimal edema within the posterior longitudinal ligament. Findings are consistent with unstable 3 column spinal injury, in the setting of possible ankylosing spondylitis. Bone contusions of T11-T12 spinous processes, with adjacent paraspinal soft tissue contusion. Additional small bone contusion at posterior inferior T11 vertebral body. Small prevertebral edema/hemorrhage at T11-T12. No significant epidural hematoma or spinal canal stenosis. Slight diffuse T1 hypointense marrow signal, nonspecific but may suggest anemia amongst multiple other etiologies such as lymphoproliferative disorder, smoking or obesity. Correlate clinically.
Pt lives in a house with her daughter and daughter's boyfriend with 1 step to enter with R sided railing. Pt has grab bars inside her tub in the bathroom. Pt was independent in all ADLs PTA, uses a cane for community ambulation.
97.9

## 2023-02-12 NOTE — OB PROVIDER TRIAGE NOTE - NSOBPROVIDERNOTE_OBGYN_ALL_OB_FT
32 y/o  @ 37.5 wks gestation presents in labor / GDMA2 for epidural : 32 y/o  @ 37.5 wks gestation presents in labor / GDMA2 for epidural :  GBS- + 2023  plan of care d/w dr alba/ dr araujo   admit to l&D  labor @ 37.5 wks gestation GDMA2 for epidural/ Ampicillin  see admission orders

## 2023-02-12 NOTE — OB PROVIDER H&P - NS_PREOPBLOODCONS_OBGYN_ALL_OB
Outreach attempt was made to schedule a follow up visit. This was the first attempt. Contact was made, appointment scheduled.      n/a

## 2023-02-12 NOTE — OB PROVIDER H&P - ASSESSMENT
34 y/o  @ 37.5 wks gestation presents in labor / GDMA2 for epidural :  GBS- + 2023  plan of care d/w dr alba/ dr araujo   admit to l&D  labor @ 37.5 wks gestation GDMA2 for epidural/ Ampicillin  see admission orders

## 2023-02-12 NOTE — OB RN TRIAGE NOTE - FALL HARM RISK - UNIVERSAL INTERVENTIONS
Bed in lowest position, wheels locked, appropriate side rails in place/Call bell, personal items and telephone in reach/Instruct patient to call for assistance before getting out of bed or chair/Non-slip footwear when patient is out of bed/[place] to call system/Physically safe environment - no spills, clutter or unnecessary equipment/Purposeful Proactive Rounding/Room/bathroom lighting operational, light cord in reach

## 2023-02-12 NOTE — OB PROVIDER IHI INDUCTION/AUGMENTATION NOTE - NS_IHIPITOCINATTEND_OBGYN_ALL_OB_FT
Writer called Ct  with Dr. Montenegro regarding needing a Covid test PCR. Per Dr. Jama pt prefers to get it done here so he doesn't have to drive to [place]. Will need order faxed. Ct is on vacation so spoke with Kimmy OLIVA. Pt will need Covid test 72 hrs before surgery, she will fax over order. [facility] is affiliated with [facility].  Once result is received, please fax them to [phone]    Writer called and spoke with Mariela OLIVA in the walk in. The walk in can complete Covid PCR order. Order received. Writer called pt and informed him that he can go to the walk in between the hrs of 8:00-4:00 pm on Sunday, 6/12/22. Writer will leave order with the walk in and make a copy and place it up front by the 's desk (pt aware).     
Per Dr. Jama order the Covid test PCR under her name. Order placed.   
lily
Lillie Cooney

## 2023-02-12 NOTE — OB PROVIDER TRIAGE NOTE - NSOBPROVIDERNOTE_OBGYN_ALL_OB_FT
32yo  at 37+5 presenting from OB triage from home for rule out labor.   Pt. placed on continuous external TOCO  NST-reactive  IV+Labs  VE:2/70%/-2 intact  BPP:  JUANY:11.32  TAUS:vertex, cephalic  Placenta: anterior  Pt. was last here 23 for complaints of chest pain, EKG was within normal limits, HELLP labs done Pr/Cr 0.3-->pt. was cleared to go home  Hx. Fibroids, denies pain or hx. of menorrhagia or metrorrhagia   Hx. asthma last attack years ago, last hospitalization at 16yo, has albuterol rescue inhaler  Pt. has a follow-up appointment tomorrow 2023 with private Obgyn  Pt. denies decreased fetal movement, visualization of blood, fluids, headache, RUQ pain, signs of oliguria, blurry vision, fever, cough, chills, sob, palpitations, n/v.  Discussed with  34yo  at 37+5 presenting from OB triage from home for rule out labor.   Pt. placed on continuous external TOCO  NST-reactive  IV+Labs  VE:2/70%/-2 intact  BPP:  JUANY:11.32  TAUS: vertex, cephalic  Placenta: anterior  Pt. was last here 23 for complaints of chest pain, EKG was within normal limits, HELLP labs done Pr/Cr 0.3-->pt. was cleared to go home  Hx. Fibroids, denies pain or hx. of menorrhagia or metrorrhagia   Hx. asthma last attack years ago, last hospitalization at 16yo, has albuterol rescue inhaler  Pt. has a follow-up appointment tomorrow 2023 with private Obgyn  Pt. educated on the trigger warnings on when to return to triage, pt. verbalizes understanding and signs discharge consent-->pt. clinically stable for discharge today.  Pt. denies decreased fetal movement, visualization of blood, fluids, headache, RUQ pain, signs of oliguria, blurry vision, fever, cough, chills, sob, palpitations, n/v.  Discussed with

## 2023-02-12 NOTE — OB PROVIDER IHI INDUCTION/AUGMENTATION NOTE - NS_CHECKALL_OBGYN_ALL_OB
Order was written/H&P was completed/Contractions pattern was reviewed/FHR was reviewed/Induction / Augmentation was discussed
normal...
Order was written/H&P was completed/Contractions pattern was reviewed/FHR was reviewed/Induction / Augmentation was discussed

## 2023-02-12 NOTE — OB PROVIDER TRIAGE NOTE - ADDITIONAL INSTRUCTIONS
34yo  at 37+5 presenting from OB triage from home for rule out labor.   Pt. has a follow-up appointment tomorrow 2023 with private Obgyn  Pt. educated on the trigger warnings on when to return to triage, pt. verbalizes understanding and signs discharge consent-->pt. clinically stable for discharge today.  BPP:8  JUANY:11.32  TAUS: vertex, cephalic  Placenta: anterior  Pt. denies decreased fetal movement, visualization of blood, fluids, headache, RUQ pain, signs of oliguria, blurry vision, fever, cough, chills, sob, palpitations, n/v.  Discussed with

## 2023-02-12 NOTE — OB PROVIDER TRIAGE NOTE - HISTORY OF PRESENT ILLNESS
32 y/o  @ 37.5 wks gestation presents with c/o painful uterine contractions every 4 minutes pt was seen and evaluated in D&T this afternoon  and was 2cm /60/-3 denies any LOF or VB reports +FM denies any n/v/d denies any fever or chills ap care comp by :   GDMA2- Admelog 8 units with meals   Humulin N 18 units hs   BMI: 38.2   baby ASA til 33 wks gestation       NKA  med hx:  hx asthma as child- last attack as a child   surg hx:   tonsillectomy age 15   gyn hx:   fibroids/ ovarian cyst   ob hx:  denies  meds: PNV   pepcid  Admelog 8 units with meals  Humulin N 18 units hs

## 2023-02-12 NOTE — OB PROVIDER H&P - HISTORY OF PRESENT ILLNESS
"Cristian Rene is a 57 year old male who is being evaluated via a billable telephone visit.      The patient has been notified of following:     \"This telephone visit will be conducted via a call between you and your physician/provider. We have found that certain health care needs can be provided without the need for a physical exam.  This service lets us provide the care you need with a short phone conversation.  If a prescription is necessary we can send it directly to your pharmacy.  If lab work is needed we can place an order for that and you can then stop by our lab to have the test done at a later time.    Telephone visits are billed at different rates depending on your insurance coverage. During this emergency period, for some insurers they may be billed the same as an in-person visit.  Please reach out to your insurance provider with any questions.    If during the course of the call the physician/provider feels a telephone visit is not appropriate, you will not be charged for this service.\"    Patient has given verbal consent for Telephone visit?  YES    What phone number would you like to be contacted at?   [phone]      Phone call duration: -- see below ---  minutes     Reinaldo Arreaga, EMT  " 32 y/o  @ 37.5 wks gestation presents with c/o painful uterine contractions every 4 minutes pt was seen and evaluated in D&T this afternoon  and was 2cm /60/-3 denies any LOF or VB reports +FM denies any n/v/d denies any fever or chills ap care comp by :   GDMA2- Admelog 8 units with meals   Humulin N 18 units hs   BMI: 38.2   baby ASA til 33 wks gestation       NKA  med hx:  hx asthma as child- last attack as a child   surg hx:   tonsillectomy age 15   gyn hx:   fibroids/ ovarian cyst   ob hx:  denies  meds: PNV   pepcid  Admelog 8 units with meals  Humulin N 18 units hs

## 2023-02-12 NOTE — OB PROVIDER TRIAGE NOTE - HISTORY OF PRESENT ILLNESS
32yo  at 37+5 presenting from OB triage from home for rule out labor. Pt. denies decreased fetal movement, visualization of blood, fluids, headache, RUQ pain, signs of oliguria, blurry vision, fever, cough, chills, sob, palpitations, n/v.

## 2023-02-13 ENCOUNTER — APPOINTMENT (OUTPATIENT)
Dept: OBGYN | Facility: CLINIC | Age: 33
End: 2023-02-13

## 2023-02-13 DIAGNOSIS — D21.9 BENIGN NEOPLASM OF CONNECTIVE AND OTHER SOFT TISSUE, UNSPECIFIED: ICD-10-CM

## 2023-02-13 DIAGNOSIS — N83.209 UNSPECIFIED OVARIAN CYST, UNSPECIFIED SIDE: ICD-10-CM

## 2023-02-13 DIAGNOSIS — O47.1 FALSE LABOR AT OR AFTER 37 COMPLETED WEEKS OF GESTATION: ICD-10-CM

## 2023-02-13 DIAGNOSIS — O99.891 OTHER SPECIFIED DISEASES AND CONDITIONS COMPLICATING PREGNANCY: ICD-10-CM

## 2023-02-13 DIAGNOSIS — Z3A.37 37 WEEKS GESTATION OF PREGNANCY: ICD-10-CM

## 2023-02-13 DIAGNOSIS — O24.415 GESTATIONAL DIABETES MELLITUS IN PREGNANCY, CONTROLLED BY ORAL HYPOGLYCEMIC DRUGS: ICD-10-CM

## 2023-02-13 DIAGNOSIS — O34.83 MATERNAL CARE FOR OTHER ABNORMALITIES OF PELVIC ORGANS, THIRD TRIMESTER: ICD-10-CM

## 2023-02-13 LAB
ALBUMIN SERPL ELPH-MCNC: 2.7 G/DL — LOW (ref 3.3–5)
ALP SERPL-CCNC: 111 U/L — SIGNIFICANT CHANGE UP (ref 40–120)
ALT FLD-CCNC: 19 U/L — SIGNIFICANT CHANGE UP (ref 4–33)
ANION GAP SERPL CALC-SCNC: 10 MMOL/L — SIGNIFICANT CHANGE UP (ref 7–14)
APTT BLD: 26.3 SEC — LOW (ref 27–36.3)
AST SERPL-CCNC: 23 U/L — SIGNIFICANT CHANGE UP (ref 4–32)
BASOPHILS # BLD AUTO: 0.04 K/UL — SIGNIFICANT CHANGE UP (ref 0–0.2)
BASOPHILS NFR BLD AUTO: 0.2 % — SIGNIFICANT CHANGE UP (ref 0–2)
BILIRUB SERPL-MCNC: 0.6 MG/DL — SIGNIFICANT CHANGE UP (ref 0.2–1.2)
BUN SERPL-MCNC: 10 MG/DL — SIGNIFICANT CHANGE UP (ref 7–23)
CALCIUM SERPL-MCNC: 8.7 MG/DL — SIGNIFICANT CHANGE UP (ref 8.4–10.5)
CHLORIDE SERPL-SCNC: 100 MMOL/L — SIGNIFICANT CHANGE UP (ref 98–107)
CO2 SERPL-SCNC: 18 MMOL/L — LOW (ref 22–31)
COVID-19 SPIKE DOMAIN AB INTERP: POSITIVE
COVID-19 SPIKE DOMAIN ANTIBODY RESULT: >250 U/ML — HIGH
CREAT SERPL-MCNC: 0.75 MG/DL — SIGNIFICANT CHANGE UP (ref 0.5–1.3)
EGFR: 107 ML/MIN/1.73M2 — SIGNIFICANT CHANGE UP
EOSINOPHIL # BLD AUTO: 0 K/UL — SIGNIFICANT CHANGE UP (ref 0–0.5)
EOSINOPHIL NFR BLD AUTO: 0 % — SIGNIFICANT CHANGE UP (ref 0–6)
FIBRINOGEN PPP-MCNC: 600 MG/DL — HIGH (ref 200–465)
GLUCOSE BLDC GLUCOMTR-MCNC: 121 MG/DL — HIGH (ref 70–99)
GLUCOSE SERPL-MCNC: 298 MG/DL — HIGH (ref 70–99)
HCT VFR BLD CALC: 39.7 % — SIGNIFICANT CHANGE UP (ref 34.5–45)
HGB BLD-MCNC: 13.3 G/DL — SIGNIFICANT CHANGE UP (ref 11.5–15.5)
IANC: 14.82 K/UL — HIGH (ref 1.8–7.4)
IMM GRANULOCYTES NFR BLD AUTO: 0.6 % — SIGNIFICANT CHANGE UP (ref 0–0.9)
INR BLD: 1.05 RATIO — SIGNIFICANT CHANGE UP (ref 0.88–1.16)
LDH SERPL L TO P-CCNC: 223 U/L — SIGNIFICANT CHANGE UP (ref 135–225)
LYMPHOCYTES # BLD AUTO: 0.64 K/UL — LOW (ref 1–3.3)
LYMPHOCYTES # BLD AUTO: 3.8 % — LOW (ref 13–44)
MCHC RBC-ENTMCNC: 30.8 PG — SIGNIFICANT CHANGE UP (ref 27–34)
MCHC RBC-ENTMCNC: 33.5 GM/DL — SIGNIFICANT CHANGE UP (ref 32–36)
MCV RBC AUTO: 91.9 FL — SIGNIFICANT CHANGE UP (ref 80–100)
MONOCYTES # BLD AUTO: 1.13 K/UL — HIGH (ref 0–0.9)
MONOCYTES NFR BLD AUTO: 6.8 % — SIGNIFICANT CHANGE UP (ref 2–14)
NEUTROPHILS # BLD AUTO: 14.82 K/UL — HIGH (ref 1.8–7.4)
NEUTROPHILS NFR BLD AUTO: 88.6 % — HIGH (ref 43–77)
NRBC # BLD: 0 /100 WBCS — SIGNIFICANT CHANGE UP (ref 0–0)
NRBC # FLD: 0 K/UL — SIGNIFICANT CHANGE UP (ref 0–0)
PLATELET # BLD AUTO: 161 K/UL — SIGNIFICANT CHANGE UP (ref 150–400)
POTASSIUM SERPL-MCNC: 4.2 MMOL/L — SIGNIFICANT CHANGE UP (ref 3.5–5.3)
POTASSIUM SERPL-SCNC: 4.2 MMOL/L — SIGNIFICANT CHANGE UP (ref 3.5–5.3)
PROT SERPL-MCNC: 6.2 G/DL — SIGNIFICANT CHANGE UP (ref 6–8.3)
PROTHROM AB SERPL-ACNC: 12.2 SEC — SIGNIFICANT CHANGE UP (ref 10.5–13.4)
RBC # BLD: 4.32 M/UL — SIGNIFICANT CHANGE UP (ref 3.8–5.2)
RBC # FLD: 13.4 % — SIGNIFICANT CHANGE UP (ref 10.3–14.5)
SARS-COV-2 IGG+IGM SERPL QL IA: >250 U/ML — HIGH
SARS-COV-2 IGG+IGM SERPL QL IA: POSITIVE
SODIUM SERPL-SCNC: 128 MMOL/L — LOW (ref 135–145)
T PALLIDUM AB TITR SER: NEGATIVE — SIGNIFICANT CHANGE UP
URATE SERPL-MCNC: 4.5 MG/DL — SIGNIFICANT CHANGE UP (ref 2.5–7)
WBC # BLD: 16.73 K/UL — HIGH (ref 3.8–10.5)
WBC # FLD AUTO: 16.73 K/UL — HIGH (ref 3.8–10.5)

## 2023-02-13 PROCEDURE — 59400 OBSTETRICAL CARE: CPT | Mod: U9,UB,GC

## 2023-02-13 RX ORDER — ACETAMINOPHEN 500 MG
975 TABLET ORAL
Refills: 0 | Status: DISCONTINUED | OUTPATIENT
Start: 2023-02-13 | End: 2023-02-16

## 2023-02-13 RX ORDER — HYDROCORTISONE 1 %
1 OINTMENT (GRAM) TOPICAL EVERY 6 HOURS
Refills: 0 | Status: DISCONTINUED | OUTPATIENT
Start: 2023-02-13 | End: 2023-02-16

## 2023-02-13 RX ORDER — OXYTOCIN 10 UNIT/ML
333.33 VIAL (ML) INJECTION
Qty: 20 | Refills: 0 | Status: DISCONTINUED | OUTPATIENT
Start: 2023-02-13 | End: 2023-02-13

## 2023-02-13 RX ORDER — DIPHENHYDRAMINE HCL 50 MG
25 CAPSULE ORAL EVERY 6 HOURS
Refills: 0 | Status: DISCONTINUED | OUTPATIENT
Start: 2023-02-13 | End: 2023-02-16

## 2023-02-13 RX ORDER — BENZOCAINE 10 %
1 GEL (GRAM) MUCOUS MEMBRANE EVERY 6 HOURS
Refills: 0 | Status: DISCONTINUED | OUTPATIENT
Start: 2023-02-13 | End: 2023-02-16

## 2023-02-13 RX ORDER — PRAMOXINE HYDROCHLORIDE 150 MG/15G
1 AEROSOL, FOAM RECTAL EVERY 4 HOURS
Refills: 0 | Status: DISCONTINUED | OUTPATIENT
Start: 2023-02-13 | End: 2023-02-16

## 2023-02-13 RX ORDER — OXYTOCIN 10 UNIT/ML
41.67 VIAL (ML) INJECTION
Qty: 20 | Refills: 0 | Status: DISCONTINUED | OUTPATIENT
Start: 2023-02-13 | End: 2023-02-13

## 2023-02-13 RX ORDER — SIMETHICONE 80 MG/1
80 TABLET, CHEWABLE ORAL EVERY 4 HOURS
Refills: 0 | Status: DISCONTINUED | OUTPATIENT
Start: 2023-02-13 | End: 2023-02-16

## 2023-02-13 RX ORDER — IBUPROFEN 200 MG
600 TABLET ORAL EVERY 6 HOURS
Refills: 0 | Status: DISCONTINUED | OUTPATIENT
Start: 2023-02-13 | End: 2023-02-16

## 2023-02-13 RX ORDER — DIBUCAINE 1 %
1 OINTMENT (GRAM) RECTAL EVERY 6 HOURS
Refills: 0 | Status: DISCONTINUED | OUTPATIENT
Start: 2023-02-13 | End: 2023-02-16

## 2023-02-13 RX ORDER — OXYCODONE HYDROCHLORIDE 5 MG/1
5 TABLET ORAL
Refills: 0 | Status: DISCONTINUED | OUTPATIENT
Start: 2023-02-13 | End: 2023-02-16

## 2023-02-13 RX ORDER — OXYCODONE HYDROCHLORIDE 5 MG/1
5 TABLET ORAL ONCE
Refills: 0 | Status: DISCONTINUED | OUTPATIENT
Start: 2023-02-13 | End: 2023-02-16

## 2023-02-13 RX ORDER — KETOROLAC TROMETHAMINE 30 MG/ML
30 SYRINGE (ML) INJECTION ONCE
Refills: 0 | Status: DISCONTINUED | OUTPATIENT
Start: 2023-02-13 | End: 2023-02-13

## 2023-02-13 RX ORDER — LANOLIN
1 OINTMENT (GRAM) TOPICAL EVERY 6 HOURS
Refills: 0 | Status: DISCONTINUED | OUTPATIENT
Start: 2023-02-13 | End: 2023-02-16

## 2023-02-13 RX ORDER — AER TRAVELER 0.5 G/1
1 SOLUTION RECTAL; TOPICAL EVERY 4 HOURS
Refills: 0 | Status: DISCONTINUED | OUTPATIENT
Start: 2023-02-13 | End: 2023-02-16

## 2023-02-13 RX ORDER — MAGNESIUM HYDROXIDE 400 MG/1
30 TABLET, CHEWABLE ORAL
Refills: 0 | Status: DISCONTINUED | OUTPATIENT
Start: 2023-02-13 | End: 2023-02-16

## 2023-02-13 RX ORDER — TETANUS TOXOID, REDUCED DIPHTHERIA TOXOID AND ACELLULAR PERTUSSIS VACCINE, ADSORBED 5; 2.5; 8; 8; 2.5 [IU]/.5ML; [IU]/.5ML; UG/.5ML; UG/.5ML; UG/.5ML
0.5 SUSPENSION INTRAMUSCULAR ONCE
Refills: 0 | Status: DISCONTINUED | OUTPATIENT
Start: 2023-02-13 | End: 2023-02-16

## 2023-02-13 RX ORDER — OXYTOCIN 10 UNIT/ML
10 VIAL (ML) INJECTION ONCE
Refills: 0 | Status: COMPLETED | OUTPATIENT
Start: 2023-02-13 | End: 2023-02-13

## 2023-02-13 RX ORDER — SODIUM CHLORIDE 9 MG/ML
3 INJECTION INTRAMUSCULAR; INTRAVENOUS; SUBCUTANEOUS EVERY 8 HOURS
Refills: 0 | Status: DISCONTINUED | OUTPATIENT
Start: 2023-02-13 | End: 2023-02-14

## 2023-02-13 RX ORDER — IBUPROFEN 200 MG
600 TABLET ORAL EVERY 6 HOURS
Refills: 0 | Status: COMPLETED | OUTPATIENT
Start: 2023-02-13 | End: 2024-01-12

## 2023-02-13 RX ADMIN — Medication 600 MILLIGRAM(S): at 12:30

## 2023-02-13 RX ADMIN — Medication 600 MILLIGRAM(S): at 17:08

## 2023-02-13 RX ADMIN — Medication 600 MILLIGRAM(S): at 05:09

## 2023-02-13 RX ADMIN — Medication 600 MILLIGRAM(S): at 11:56

## 2023-02-13 RX ADMIN — Medication 10 UNIT(S): at 01:50

## 2023-02-13 RX ADMIN — SODIUM CHLORIDE 3 MILLILITER(S): 9 INJECTION INTRAMUSCULAR; INTRAVENOUS; SUBCUTANEOUS at 21:15

## 2023-02-13 RX ADMIN — SODIUM CHLORIDE 3 MILLILITER(S): 9 INJECTION INTRAMUSCULAR; INTRAVENOUS; SUBCUTANEOUS at 06:20

## 2023-02-13 RX ADMIN — Medication 600 MILLIGRAM(S): at 18:00

## 2023-02-13 RX ADMIN — Medication 600 MILLIGRAM(S): at 06:09

## 2023-02-13 RX ADMIN — Medication 108 GRAM(S): at 00:30

## 2023-02-13 NOTE — OB RN DELIVERY SUMMARY - NSSELHIDDEN_OBGYN_ALL_OB_FT
[NS_DeliveryAttending1_OBGYN_ALL_OB_FT:[id number]==],[NS_DeliveryAssist1_OBGYN_ALL_OB_FT:[id number]],[NS_DeliveryAssist2_OBGYN_ALL_OB_FT:[id number]],[NS_DeliveryRN_OBGYN_ALL_OB_FT:[id number]]

## 2023-02-13 NOTE — OB PROVIDER DELIVERY SUMMARY - NSPROVIDERDELIVERYNOTE_OBGYN_ALL_OB_FT
Patient was found to be fully dilated and directed to push with contractions. Spontaneous vaginal delivery of liveborn male. As head was being delivered, nuchal x1 noted. Nuchal reduced and delivered through. Shoulders and body delivered easily. Cord was delayed 1 minute. Cord was cut. Infant was passed to mother and stimulation, airway clearance, and drying performed. Placenta delivered spontaneously intact. Uterine massage was performed and Pitocin was given. Vaginal walls, sulci, and cervix examined and noted to be intact. Uterine fundus reexamined and noted to be mildly atonic. Uterine massage performed and Cyto IN administered. Fundus reexamined and was firm.   Good hemostasis was noted.  Count correct x2.

## 2023-02-13 NOTE — OB PROVIDER DELIVERY SUMMARY - NSLOWPPHRISK_OBGYN_A_OB
Leal Pregnancy/Less than or equal to 4 previous vaginal births/No known bleeding disorder/No history of postpartum hemorrhage

## 2023-02-13 NOTE — OB PROVIDER LABOR PROGRESS NOTE - NS_SUBJECTIVE/OBJECTIVE_OBGYN_ALL_OB_FT
Pt seen and examined for labor progress - having some discomfort with contractions but otherwise coping well  VSS  Cat 1   [place]: 4-5  Pitocin @ 8 mu/min  VE: 6/80/-2, AROM clear
PAtient examined s/p epidural
Pt seen and examined for painful rectal pressure  Pitocin @ 8 mu/min  VE unchanged
Pt seen and examined for rectal pressure  VSS  Cat 2 FHR, 150, mild variables in setting of moderate variability  [place]: q2-3  VE: 10/100/0 to +1
Pt examined for cervical change due to increasing pain.

## 2023-02-13 NOTE — OB NEONATOLOGY/PEDIATRICIAN DELIVERY SUMMARY - NSPEDSNEONOTESA_OBGYN_ALL_OB_FT
Peds called to delivery for category II tracing. 37.6 wk AGA male born via  to a 32 y/o  mother. Maternal medical/surgical hx of GDMA2 on insulin. Maternal labs include Blood Type O+, HIV -, RPR NR, Rubella I, Hep B -, GBS + (received ampx2). AROM at 21:46 on  with clear fluids (ROM hours: 3H52M). Category 2 tracing. Baby emerged vigorous, crying, was w/d/s/s with APGARS of 8/9. Arrived at [facility], exam without concerns. Mom plans to initiate breastfeeding and formula feeding, declines Hep B vaccine and consents circ.  Highest maternal temp: 37.0. EOS 0.08. Admitted to NBN.    Physical Exam:  Gen: no acute distress, +grimace  HEENT:  anterior fontanel open soft and flat, nondysmorphic facies, no cleft lip/palate, ears normal set, no ear pits or tags, nares clinically patent  Resp: Normal respiratory effort without grunting or retractions, good air entry b/l, clear to auscultation bilaterally  Cardio: Present S1/S2, regular rate and rhythm, no murmurs  Abd: soft, non tender, non distended, umbilical cord with 3 vessels  Neuro: +palmar and plantar grasp, +suck, +ronni, normal tone  Extremities: moving all extremities, no clavicular crepitus or stepoff  Skin: pink, warm  Genitals: Normal male anatomy, testicles palpable in scrotum b/l, Jesus 1, anus patent

## 2023-02-13 NOTE — OB PROVIDER LABOR PROGRESS NOTE - ASSESSMENT
Anesthesia paged  Cont Iain Zavala CNM
IOL, doing well, making cervical change  Cont current management  Cont Pitocin  Repositioned  Anesthesia paged for top  Reassess in 2 hrs/prn  Dr. Cooney updated  AMARJIT Zavala
2nd stage, overall reassuring fetal status  Pt instructed on pushing technique, will begin efforts  Anticipate NSD  Dr. Cooney notified  AMARJIT Zavala
32yo  at 37.5wk in labor  -pt requesting epidural for pain management  -GBS pos, Amp for ppx  -cat 1 FHT  -hx of childhood asthma    D/w Dr. Yukhayev RFrankel PGY4
-Start pitocin for induction  -AROM when head more well applied  -REassuring fetal status

## 2023-02-13 NOTE — PROVIDER CONTACT NOTE (OTHER) - ASSESSMENT
UTerus firm with massage. Pt denies lightheadedness ,dizziness, SOB. vitals: BP: 145/67   Pt reports feeling urge to void.

## 2023-02-13 NOTE — OB RN DELIVERY SUMMARY - NS_SEPSISRSKCALC_OBGYN_ALL_OB_FT
EOS calculated successfully. EOS Risk Factor: 0.5/1000 live births ([facility] national incidence); GA=37w6d; Temp=98.78; ROM=24.867; GBS='Positive'; Antibiotics='Broad spectrum antibiotics 2-3.9 hrs prior to birth'

## 2023-02-13 NOTE — OB PROVIDER DELIVERY SUMMARY - NSSELHIDDEN_OBGYN_ALL_OB_FT
[NS_DeliveryAttending1_OBGYN_ALL_OB_FT:[id number]==],[NS_DeliveryAssist1_OBGYN_ALL_OB_FT:[id number]==],[NS_DeliveryAssist2_OBGYN_ALL_OB_FT:[id number]=]

## 2023-02-14 ENCOUNTER — TRANSCRIPTION ENCOUNTER (OUTPATIENT)
Age: 33
End: 2023-02-14

## 2023-02-14 ENCOUNTER — APPOINTMENT (OUTPATIENT)
Dept: MATERNAL FETAL MEDICINE | Facility: CLINIC | Age: 33
End: 2023-02-14

## 2023-02-14 ENCOUNTER — APPOINTMENT (OUTPATIENT)
Dept: ANTEPARTUM | Facility: CLINIC | Age: 33
End: 2023-02-14

## 2023-02-14 RX ORDER — INSULIN NPH HUM/REG INSULIN HM 70-30/ML
18 VIAL (ML) SUBCUTANEOUS
Qty: 0 | Refills: 0 | DISCHARGE

## 2023-02-14 RX ORDER — FAMOTIDINE 10 MG/ML
0 INJECTION INTRAVENOUS
Qty: 0 | Refills: 0 | DISCHARGE

## 2023-02-14 RX ORDER — INSULIN LISPRO 100/ML
8 VIAL (ML) SUBCUTANEOUS
Qty: 0 | Refills: 0 | DISCHARGE

## 2023-02-14 RX ORDER — IBUPROFEN 200 MG
1 TABLET ORAL
Qty: 0 | Refills: 0 | DISCHARGE
Start: 2023-02-14

## 2023-02-14 RX ADMIN — Medication 600 MILLIGRAM(S): at 23:32

## 2023-02-14 RX ADMIN — Medication 600 MILLIGRAM(S): at 01:45

## 2023-02-14 RX ADMIN — Medication 600 MILLIGRAM(S): at 11:53

## 2023-02-14 RX ADMIN — Medication 600 MILLIGRAM(S): at 12:26

## 2023-02-14 RX ADMIN — Medication 975 MILLIGRAM(S): at 20:31

## 2023-02-14 RX ADMIN — Medication 1 TABLET(S): at 11:54

## 2023-02-14 RX ADMIN — SODIUM CHLORIDE 3 MILLILITER(S): 9 INJECTION INTRAMUSCULAR; INTRAVENOUS; SUBCUTANEOUS at 06:15

## 2023-02-14 RX ADMIN — Medication 600 MILLIGRAM(S): at 05:26

## 2023-02-14 RX ADMIN — Medication 600 MILLIGRAM(S): at 00:48

## 2023-02-14 RX ADMIN — Medication 975 MILLIGRAM(S): at 21:05

## 2023-02-14 RX ADMIN — Medication 600 MILLIGRAM(S): at 06:15

## 2023-02-14 RX ADMIN — SODIUM CHLORIDE 3 MILLILITER(S): 9 INJECTION INTRAMUSCULAR; INTRAVENOUS; SUBCUTANEOUS at 14:16

## 2023-02-14 NOTE — DISCHARGE NOTE OB - PATIENT PORTAL LINK FT
You can access the FollowMyHealth Patient Portal offered by [facility] by registering at the following website: [email or web address]. By joining [employer]’s FollowMyHealth portal, you will also be able to view your health information using other applications (apps) compatible with our system.

## 2023-02-14 NOTE — PROGRESS NOTE ADULT - ASSESSMENT
32yo PPD#1 s/p .  Patient is stable and doing well post-partum.   - Pain well controlled, continue current pain regimen  - Increase ambulation, SCDs when not ambulating  - Continue regular diet  - D/c home tomorrow       feliciano BECK

## 2023-02-14 NOTE — DISCHARGE NOTE OB - CARE PROVIDER_API CALL
Jean Quarles)  Obstetrics and Gynecology  [address], [address]th [place] [address]  Phone: [phone]  Fax: [phone]  Follow Up Time:

## 2023-02-14 NOTE — DISCHARGE NOTE OB - PLAN OF CARE
After discharge, please stay on pelvic rest for 6 weeks, meaning no sexual intercourse, no tampons and no douching. Expect to have vaginal bleeding/spotting for up to six weeks.  The bleeding should get lighter and more white/light brown with time.  For bleeding soaking more than a pad an hour or passing clots greater than the size of your fist, come in to the emergency department

## 2023-02-14 NOTE — DISCHARGE NOTE OB - NS MD DC FALL RISK RISK
For information on Fall & Injury Prevention, visit: [email or web address] OR  [email or web address] OR  https://www.cdc.gov/steadi/patient.html

## 2023-02-14 NOTE — DISCHARGE NOTE OB - MEDICATION SUMMARY - MEDICATIONS TO STOP TAKING
I will STOP taking the medications listed below when I get home from the hospital:    Admelog 100 units/mL injectable solution  -- 8 unit(s) injectable 2 times a day (before meals)    HumuLIN 50/50 subcutaneous suspension  -- 18 unit(s) subcutaneous once a day (at bedtime)

## 2023-02-14 NOTE — DISCHARGE NOTE OB - CARE PLAN
1 Principal Discharge DX:	Vaginal delivery  Assessment and plan of treatment:	After discharge, please stay on pelvic rest for 6 weeks, meaning no sexual intercourse, no tampons and no douching. Expect to have vaginal bleeding/spotting for up to six weeks.  The bleeding should get lighter and more white/light brown with time.  For bleeding soaking more than a pad an hour or passing clots greater than the size of your fist, come in to the emergency department

## 2023-02-15 RX ADMIN — Medication 600 MILLIGRAM(S): at 00:27

## 2023-02-15 RX ADMIN — Medication 600 MILLIGRAM(S): at 22:01

## 2023-02-15 RX ADMIN — Medication 600 MILLIGRAM(S): at 22:35

## 2023-02-15 NOTE — PROGRESS NOTE ADULT - SUBJECTIVE AND OBJECTIVE BOX
COLBY BRAVO   MRN# [id number]    SUBJECTIVE:    Pain: Controlled  Complaints: None    MILESTONES:   Alert and oriented x 3  [X  ]Out of bed /ambulating [ X ]  Voiding [X  ]  Due to void [  ]  Tolerating a regular diet [ X  ]  Infant feeding:   Breast [  ]   Bottle [  ]     Both [  ]                         Feeding related issues or concerns:    Objective   T(C): 36.4 (02-15-23 @ 05:44), Max: 36.4 (23 @ 17:35)  HR: 78 (02-15-23 @ 05:44) (64 - 78)  BP: 101/52 (02-15-23 @ 05:44) (101/52 - 118/59)  RR: 18 (02-15-23 @ 05:44) (17 - 18)  SpO2: 100% (02-15-23 @ 05:44) (99% - 100%)  Wt(kg): --    LABS:                          13.3   16.73 )-----------( 161      ( 2023 03:40 )             39.7                         14.7   11.14 )-----------( 189      ( 2023 16:15 )             43.6                         14.5   8.40  )-----------( 181      ( 2023 20:50 )             42.5       Blood Type: O Positive      Treponema Pallidum Antibody Interpretation: Negative ( @ 16:15)             ASSESSMENT:     33y          G1  P  1001   PP Day #  2     Delivery:   [X ]             QBL - 323    (pit/cyto)    Assisted delivery  :    Vacuum   [  ]   Forceps)  [  ]    Indication for assisted delivery: Tracing Abn [  ]     Maternal Exhaustion [  ]     Other [  ]    PAST MEDICAL & SURGICAL HISTORY:  Fibroids      Ovarian cyst      Asthma  Hospitalization as a child , no intubation , no recent episode      History of tonsillectomy  At age 15          Current Issues:   Breasts: Soft [ X ]    Engorged [  ]    Abdomen: Soft [ X ]  Nontender [X  ]  Nondistended [  ]   Distended [  ]                       Fundus firm [X  ]    Fundus boggy [   ]   Bowel sounds present [  ]     Bowel sounds absent  [  ]     Vagina: Lochia  Mod [  ]      Scant [  ]  Heavy [  ]    Perineum:  Intact [ X ]   Laceration   [  ]   Type of laceration _____________,     Episiotomy [ X ]    Type of episiotomy _______________    Lower extremities: Edema[  ]    Noted bilaterally [  ]       Non-tender calves [  ]      Negative Shanika's sign [  ]    Positive pedal pulses [  ]    Other relevant physical exam findings:      PLAN:    Plan: Increase ambulation, analgesia PRN and pain medication  protocol standing oxycodone, ibuprofen and acetaminophen.  Diet: Continue regular diet      Continue routine postpartum care.  This patient has PEC w/o Severe Features. . She stated that she already has a B/P Monitor at home. Reviewed the criteria for monitoring and reporting her B/P's and the patient verbalized understanding of the information given.    Diabetes - For Repeat Glucose Testing in 6 Weeks    Discharge Planning [ X ]        Discharge Home Today [  X  ]    Consults :  Social Work [ X ]     Lactation [  ]    Other [   ]
OB Progress Note:  PPD#1    S: 32yo PPD#1 s/p . Patient feels well. Pain is well controlled. She is tolerating a regular diet. She is voiding spontaneously and ambulating without difficulty. Denies CP/SOB. Denies lightheadedness/dizziness. Denies N/V.    O:  Vitals:  Vital Signs Last 24 Hrs  T(C): 36.3 (2023 05:56), Max: 36.9 (2023 09:17)  T(F): 97.4 (2023 05:56), Max: 98.5 (2023 13:30)  HR: 75 (2023 05:56) (75 - 98)  BP: 101/50 (2023 05:56) (101/50 - 127/54)  BP(mean): --  RR: 18 (2023 05:56) (18 - 18)  SpO2: 100% (2023 05:56) (99% - 100%)    Parameters below as of 2023 05:56  Patient On (Oxygen Delivery Method): room air        MEDICATIONS  (STANDING):  acetaminophen     Tablet .. 975 milliGRAM(s) Oral <User Schedule>  dextrose 50% Injectable 50 milliLiter(s) IV Push every 15 minutes  dextrose 50% Injectable 25 milliLiter(s) IV Push every 15 minutes  dextrose Oral Gel 15 Gram(s) Oral once  diphtheria/tetanus/pertussis (acellular) Vaccine (Adacel) 0.5 milliLiter(s) IntraMuscular once  ibuprofen  Tablet. 600 milliGRAM(s) Oral every 6 hours  influenza   Vaccine 0.5 milliLiter(s) IntraMuscular once  prenatal multivitamin 1 Tablet(s) Oral daily  sodium chloride 0.9% lock flush 3 milliLiter(s) IV Push every 8 hours      Labs:  Blood type: O Positive  Rubella IgG: RPR: Negative                          13.3   16.73<H> >-----------< 161    (  @ 03:40 )             39.7                        14.7   11.14<H> >-----------< 189    (  @ 16:15 )             43.6    - @ 03:40      128<L>  |  100  |  10  ----------------------------<  298<H>  4.2   |  18<L>  |  0.75        Ca    8.7      2023 03:40    TPro  6.2  /  Alb  2.7<L>  /  TBili  0.6  /  DBili  x   /  AST  23  /  ALT  19  /  AlkPhos  111  23 @ 03:40          Physical Exam:  General: NAD  Abdomen: soft, non-tender, non-distended, fundus firm  Extremities: No erythema/edema

## 2023-02-15 NOTE — PROGRESS NOTE ADULT - ATTENDING COMMENTS
PPD2 with gHTN  Continue BP monitoring  Baby being assessed by pediatric surgery team, pt visibly upset  Will discharge tomorrow    feliciano BECK

## 2023-02-16 VITALS
TEMPERATURE: 98 F | HEART RATE: 81 BPM | RESPIRATION RATE: 16 BRPM | DIASTOLIC BLOOD PRESSURE: 68 MMHG | SYSTOLIC BLOOD PRESSURE: 128 MMHG | OXYGEN SATURATION: 99 %

## 2023-02-16 DIAGNOSIS — Z3A.37 37 WEEKS GESTATION OF PREGNANCY: ICD-10-CM

## 2023-02-16 DIAGNOSIS — O26.893 OTHER SPECIFIED PREGNANCY RELATED CONDITIONS, THIRD TRIMESTER: ICD-10-CM

## 2023-02-16 DIAGNOSIS — D21.9 BENIGN NEOPLASM OF CONNECTIVE AND OTHER SOFT TISSUE, UNSPECIFIED: ICD-10-CM

## 2023-02-16 DIAGNOSIS — N83.209 UNSPECIFIED OVARIAN CYST, UNSPECIFIED SIDE: ICD-10-CM

## 2023-02-16 DIAGNOSIS — J45.909 UNSPECIFIED ASTHMA, UNCOMPLICATED: ICD-10-CM

## 2023-02-16 DIAGNOSIS — R00.2 PALPITATIONS: ICD-10-CM

## 2023-02-16 DIAGNOSIS — O99.891 OTHER SPECIFIED DISEASES AND CONDITIONS COMPLICATING PREGNANCY: ICD-10-CM

## 2023-02-16 DIAGNOSIS — O24.414 GESTATIONAL DIABETES MELLITUS IN PREGNANCY, INSULIN CONTROLLED: ICD-10-CM

## 2023-02-16 DIAGNOSIS — O99.513 DISEASES OF THE RESPIRATORY SYSTEM COMPLICATING PREGNANCY, THIRD TRIMESTER: ICD-10-CM

## 2023-02-16 DIAGNOSIS — O34.83 MATERNAL CARE FOR OTHER ABNORMALITIES OF PELVIC ORGANS, THIRD TRIMESTER: ICD-10-CM

## 2023-02-16 RX ADMIN — Medication 600 MILLIGRAM(S): at 06:35

## 2023-02-16 RX ADMIN — Medication 600 MILLIGRAM(S): at 06:05

## 2023-02-17 ENCOUNTER — APPOINTMENT (OUTPATIENT)
Dept: ANTEPARTUM | Facility: CLINIC | Age: 33
End: 2023-02-17

## 2023-03-03 ENCOUNTER — RX RENEWAL (OUTPATIENT)
Age: 33
End: 2023-03-03

## 2023-03-03 RX ORDER — FAMOTIDINE 40 MG/1
40 TABLET, FILM COATED ORAL
Qty: 30 | Refills: 0 | Status: ACTIVE | COMMUNITY
Start: 2022-09-15 | End: 1900-01-01

## 2023-03-29 ENCOUNTER — APPOINTMENT (OUTPATIENT)
Dept: OBGYN | Facility: CLINIC | Age: 33
End: 2023-03-29
Payer: MEDICAID

## 2023-03-29 VITALS
WEIGHT: 261 LBS | SYSTOLIC BLOOD PRESSURE: 137 MMHG | DIASTOLIC BLOOD PRESSURE: 83 MMHG | HEART RATE: 73 BPM | BODY MASS INDEX: 38.66 KG/M2 | HEIGHT: 69 IN

## 2023-03-29 PROCEDURE — 0503F POSTPARTUM CARE VISIT: CPT

## 2023-03-31 NOTE — HISTORY OF PRESENT ILLNESS
[Delivery Date: ___] : on [unfilled] [Boy] : baby is a boy [Infant's Name ___] : [unfilled] [___ Lbs] : [unfilled] lbs [___ Oz] : [unfilled] oz [Circumcised] : circumcised [Living at Home] : is currently living at home [Bottle Feeding] : bottle feeding [Intended Contraception] : Intended Contraception: [Postpartum Follow Up] : postpartum follow up [] : delivered by vaginal delivery [Back to Normal] : is back to normal in size [Mild] : mild vaginal bleeding [Normal] : the vagina was normal [Not Done] : Examination of breasts not done [Doing Well] : is doing well [No Sign of Infection] : is showing no signs of infection [Excellent Pain Control] : has excellent pain control [None] : None [Complications:___] : no complications [Breastfeeding] : not currently nursing [Resumed Menses] : has not resumed her menses [Resumed [place]] : has not resumed intercourse [S/Sx PP Depression] : no signs/symptoms of postpartum depression [Erythema] : not erythematous [Cervix Sample Taken] : cervical sample not taken for a Pap smear [de-identified] : All of the hormonal and non-hormonal reversible methods of contraception were discussed with the patient at length. Pt will think about and make decisionat later date.This included the risks, benefits, and side effects of each. The patient had an opportunity to have all of her questions answered to her apparent satisfaction. RTO for annual.

## 2023-04-24 ENCOUNTER — TRANSCRIPTION ENCOUNTER (OUTPATIENT)
Age: 33
End: 2023-04-24

## 2023-05-16 ENCOUNTER — TRANSCRIPTION ENCOUNTER (OUTPATIENT)
Age: 33
End: 2023-05-16

## 2023-07-25 NOTE — OB PROVIDER TRIAGE NOTE - NS_PRENATALRECORD_OBGYN_ALL_OB
Specialty Pharmacy - Refill Coordination    Specialty Medication Orders Linked to Encounter      Flowsheet Row Most Recent Value   Medication #1 ustekinumab (STELARA) 90 mg/mL Syrg syringe (Order#[id number], Rx#[id number])            Refill Questions - Documented Responses      Flowsheet Row Most Recent Value   Patient Availability and HIPAA Verification    Does patient want to proceed with activity? Yes   HIPAA/medical authority confirmed? Yes   Relationship to patient of person spoken to? Self   Refill Screening Questions    Changes to allergies? No   Changes to medications? No   New conditions since last clinic visit? No   Unplanned office visit, urgent care, ED, or hospital admission in the last 4 weeks? No   How does patient/caregiver feel medication is working? Good   Financial problems or insurance changes? No   How many doses of your specialty medications were missed in the last 4 weeks? 0   Would patient like to speak to a pharmacist? No   When does the patient need to receive the medication? 07/31/23   Refill Delivery Questions    How will the patient receive the medication? Mail   When does the patient need to receive the medication? 07/31/23   Shipping Address Home   Address in [facility] confirmed and updated if neccessary? Yes   Expected Copay ($) 0   Is the patient able to afford the medication copay? Yes   Payment Method zero copay   Days supply of Refill 56   Supplies needed? No supplies needed   Refill activity completed? Yes   Refill activity plan Refill scheduled   Shipment/Pickup Date: 07/27/23            Current Outpatient Medications   Medication Sig    ALPRAZolam (XANAX) 0.25 MG tablet 1 tablet Orally Twice a day    azaTHIOprine (IMURAN) 50 mg Tab TAKE 3 TABS BY MOUTH ONCE DAILY (Patient not taking: Reported on 5/12/2023)    cyproheptadine (PERIACTIN) 4 mg tablet Take 4 mg by mouth 3 (three) times daily.    esomeprazole (NEXIUM) 40 MG capsule Take 40 mg by mouth before breakfast.     fenofibrate (TRICOR) 145 MG tablet 1 tablet Orally Once a day for 30 day(s)    gabapentin (NEURONTIN) 300 MG capsule TAKE 1 TAB BY MOUTH AT BEDTIME Oral for 30 Days    hydrOXYzine pamoate (VISTARIL) 50 MG Cap TAKE 1 CAP BY MOUTH ONCE DAILY AT BEDTIME for 30    LORazepam (ATIVAN) 1 MG tablet 1 tablet at bedtime as needed Orally Once a day    losartan (COZAAR) 50 MG tablet Take 50 mg by mouth.    mirtazapine (REMERON) 30 MG tablet Take 30 mg by mouth every evening.    multivit-min/FA/lycopen/lutein (CENTRUM SILVER MEN ORAL) Take 1 capsule by mouth once daily.    multivitamin-iron-folic acid (CENTRUM) Tab as directed    paroxetine (PAXIL) 20 MG tablet Take 20 mg by mouth every morning.    polyethylene glycol (GLYCOLAX) 17 gram PwPk Take 17 g by mouth once daily. (Patient not taking: Reported on 5/12/2023)    pravastatin (PRAVACHOL) 40 MG tablet TAKE 1 TAB BY MOUTH ONCE DAILY FOR CHOLESTEROL Orally Once a day for 30 days    psyllium husk 0.4 gram Cap 1 TAB PO DAILY for 90 days    ustekinumab (STELARA) 90 mg/mL Syrg syringe Inject 1 mL (90 mg total) into the skin every 8 weeks.   Last reviewed on 5/12/2023  1:47 PM by Erika Alafro MA    Review of patient's allergies indicates:   Allergen Reactions    Penicillins Other (See Comments)    Last reviewed on  5/12/2023 1:42 PM by Erika Alfaro      Tasks added this encounter   No tasks added.   Tasks due within next 3 months   7/25/2023 - Refill Coordination Outreach (1 time occurrence)     Meagan Sabillon [facility]  [address] [address]  Phone: [phone]  Fax: [phone]         No

## 2023-08-11 ENCOUNTER — APPOINTMENT (OUTPATIENT)
Dept: NEUROLOGY | Facility: CLINIC | Age: 33
End: 2023-08-11

## 2023-09-15 NOTE — OB RN TRIAGE NOTE - HEART RATE (BEATS/MIN)
77 Rifampin Pregnancy And Lactation Text: This medication is Pregnancy Category C and it isn't know if it is safe during pregnancy. It is also excreted in breast milk and should not be used if you are breast feeding.

## 2023-09-21 ENCOUNTER — APPOINTMENT (OUTPATIENT)
Dept: OBGYN | Facility: CLINIC | Age: 33
End: 2023-09-21

## 2023-12-30 NOTE — OB RN TRIAGE NOTE - TEMPERATURE IN CELSIUS (DEGREES C)
MEDICAL SCREENING EXAM (MSE) NOTE      Sue Law is a 14 year old male who presented to the ER today with c/o of L elbow pain. Pt had a fracture of L elbow last year. Today while wrestling began having pain and felt a popping and clicking sensation. Pt with pain to L lateral epicondyle. Had aleve at 12pm. Pt has been applying ice, lidocaine patch and has an ace wrap at this time. CMS intact distally. Good radial pulse noted      Brief Physical Exam  General: nad  Neurologic: aox3      This patient was screened by me for the purpose of initial evaluation.  I have screened the patient for an acute medical condition and have placed initial orders for the patient's diagnosis and treatment.       Alicia Rivas APNP  12/30/23 1638     36.8

## 2024-01-03 NOTE — ED ADULT TRIAGE NOTE - HEIGHT IN FEET
H&P reviewed. After examining the patient I find no changes in the patients condition since the H&P had been written.    Vitals:    01/03/24 0825   BP: 163/88   Pulse: 74   Resp: 20   Temp: (!) 96.4 °F (35.8 °C)   SpO2: 98%      5

## 2024-03-31 NOTE — ED ADULT NURSE NOTE - NSIMPLEMENTINTERV_GEN_ALL_ED
[facility]    3/30/2024 10:01 AM        Name: Isela Conner .              Admitted: 3/29/2024  Primary Care Provider: Soha Tom APRN - NP (Tel: [phone])      Chief complaint: 81 yo female with recent hospitalization (3/7-3/9) with UTI. Recent cystoscopy with Botox injection (3/26). Presented to ER with hematuria and passing clots.     Patient in independent living.    Subjective:  Up in bedside chair. Says she feels good, offers no complaints. Denies chest pain, shortness of breath, abdominal pain, flank pain, nausea. Issa to gravity, urine green in color, no clots noted    Reviewed interval ancillary notes    Current Medications  amiodarone (CORDARONE) tablet 200 mg, Daily  atorvastatin (LIPITOR) tablet 10 mg, Daily  digoxin (LANOXIN) tablet 125 mcg, Every Other Day  dilTIAZem (CARDIZEM CD) extended release capsule 180 mg, Daily  DULoxetine (CYMBALTA) extended release capsule 60 mg, Daily  furosemide (LASIX) tablet 40 mg, Daily  gabapentin (NEURONTIN) capsule 300 mg, TID  HYDROcodone-acetaminophen (NORCO) 7.5-325 MG per tablet 1 tablet, Q6H PRN  hydroxychloroquine (PLAQUENIL) tablet 200 mg, Daily  metoprolol succinate (TOPROL XL) extended release tablet 100 mg, Daily  spironolactone (ALDACTONE) tablet 25 mg, Daily  insulin lispro (HUMALOG) injection vial 0-16 Units, TID WC  insulin lispro (HUMALOG) injection vial 0-4 Units, Nightly  insulin glargine (LANTUS) injection vial 10 Units, Nightly  sodium chloride flush 0.9 % injection 5-40 mL, 2 times per day  sodium chloride flush 0.9 % injection 5-40 mL, PRN  0.9 % sodium chloride infusion, PRN  polyethylene glycol (GLYCOLAX) packet 17 g, Daily PRN  acetaminophen (TYLENOL) tablet 650 mg, Q6H PRN   Or  acetaminophen (TYLENOL) suppository 650 mg, Q6H PRN  dextrose bolus 10% 125 mL, PRN   Or  dextrose bolus 10% 250 mL, PRN  dextrose 10 % infusion,    [facility] - Inpatient Rehabilitation Department   Phone: [phone]    Physical Therapy    [] Initial Evaluation            [] Daily Treatment Note         [] Discharge Summary      Patient: Isela Conner   : 1944   MRN: [id number]   Date of Service:  3/30/2024  Admitting Diagnosis: Hematuria  Current Admission Summary: Isela Conner is a 80 y.o. female who presents to the emergency department with a chief complaint of hematuria and passing clots with some lower abdominal discomfort, dysuria and urgency to urinate that began 2 days ago.  3 days ago patient had bladder injections with Botox due to urinary incontinence.  She is anticoagulated on Eliquis.  States she did not take her Eliquis yesterday as she was concerned about the bleeding.  She is anticoagulant with history of atrial fibrillation.  Took her Eliquis this morning.  States she has been feeling weak at home.  Denies chest pain, shortness of breath, diarrhea, bloody stool or any bleeding anywhere else.  Has history of hysterectomy.  Chronically on 3 L of nasal cannula oxygen with history of pulmonary hypertension.   Bladder irrigation 3/29  Past Medical History:  has a past medical history of Diabetes mellitus ([facility]), Fatty liver, Femur fracture, left ([facility]), Fibromyalgia, Fracture, hip ([facility]), GERD (gastroesophageal reflux disease), Hypertension, IBS (irritable bowel syndrome), Osteoarthritis, Peripheral neuropathy, Postmenopausal, and Scleroderma ([facility]).  Past Surgical History:  has a past surgical history that includes Hysterectomy (); Cholecystectomy (); Lumbar disc surgery (); Throat surgery (); arthroplasty (08/10/2012); Fibula Fracture Surgery (); back surgery; Cystocopy (2018); Partial hip arthroplasty (Right, 2019); and Femur fracture surgery.  Discharge Recommendations: Isela Conner scored a 18/24 on the AM-PAC short mobility form. Current research shows that an AM-PAC score of 18 or greater    Physician Progress Note      PATIENT:               MYKE ALLEN  [facility] #:                  [id number]  :                       1944  ADMIT DATE:       3/29/2024 11:38 AM  DISCH DATE:  RESPONDING  PROVIDER #:        ROD Askew CNP          QUERY TEXT:    Pt admitted  with hematuria and is s/p cystoscopy w/ Botox on .? If   possible, please document in progress notes and discharge summary the   relationship if any between the hematuria and the surgery:    The medical record reflects the following:  Risk Factors: 80 year old female s/p cysto w/ Botox injections to the bladder  Clinical Indicators:  ED provider note- chief complaint of hematuria and   passing clots with some lower abdominal discomfort, dysuria and urgency to   urinate that began 2 days ago.  3 days ago patient had bladder injections with   Botox due to urinary incontinence.  She is anticoagulated on Eliquis.  States   she did not take her Eliquis yesterday as she was concerned about the   bleeding.  H&P- on Tuesday had cystoscopy with Botox injection for   urinary incontinence since then has been having hematuria  Treatment: Urology consult, hold Eliquis, sepulveda catheter with irrigation  Options provided:  -- Hematuria is due to the cystoscopy w/ Botox injections of the bladder  -- Hematuria is not due to the cystoscopy w/ Botox injections of the bladder,   but is due to other incidental risk factor, Please specify other incidental   risk factor.  -- Other - I will add my own diagnosis  -- Disagree - Not applicable / Not valid  -- Disagree - Clinically unable to determine / Unknown  -- Refer to Clinical Documentation Reviewer    PROVIDER RESPONSE TEXT:    Hematuria possibly secondary to recent cystoscopy in setting of DOAC use.    Query created by: Kymberly Albarran on 3/30/2024 10:49 AM      Electronically signed by:  ROD Askew CNP 3/30/2024 9:18 PM           2130: Shift assessment completed. VSS. Medications given per MAR. Bedside table and call light within reach. Bed alarm on, bed in lowest position. The care plan and education has been reviewed and mutually agreed upon with the patient.     Balbina Montemayor RN   Patient resting in bed, Issa draining clear sushant/light green urine, denies pain, Issa care done, assisted to position of comfort, patient encouraged to call with needs, call light in reach, items within reach, will continue to monitor     Patient to discharge this evening. IV removed. With no complications.     Pharmacy Home Medication Reconciliation Note    A medication reconciliation has been completed for Isela Conner 1944    Pharmacy: [facility] [address] [place] [address]    Information provided by: Patient    The patient's home medication list is as follows:  No current facility-administered medications on file prior to encounter.     Current Outpatient Medications on File Prior to Encounter   Medication Sig Dispense Refill    busPIRone (BUSPAR) 5 MG tablet Take 1-2 tablets by mouth See Admin Instructions Take 2 tabs by mouth in AM, then take 1 tab by mouth in PM      Semaglutide,0.25 or 0.5MG/DOS, (OZEMPIC, 0.25 OR 0.5 MG/DOSE,) 2 MG/1.5ML SOPN Inject 0.5 mg into the skin Once a week at 5 PM      amiodarone (CORDARONE) 200 MG tablet Take 1 tablet by mouth daily 90 tablet 1    digoxin (LANOXIN) 125 MCG tablet Take 1 tablet by mouth every other day 90 tablet 3    spironolactone (ALDACTONE) 25 MG tablet TAKE 1 TABLET DAILY 90 tablet 1    apixaban (ELIQUIS) 5 MG TABS tablet TAKE 1 TABLET TWICE A  tablet 3    atorvastatin (LIPITOR) 10 MG tablet Take 1 tablet by mouth daily 90 tablet 1    furosemide (LASIX) 40 MG tablet Take 1 tablet by mouth daily 60 tablet 3    dilTIAZem (CARDIZEM CD) 180 MG extended release capsule TAKE 1 CAPSULE DAILY 90 capsule 3    metoprolol succinate (TOPROL XL) 100 MG extended release tablet TAKE 1 TABLET DAILY 135 tablet 3    bosentan (TRACLEER) 125 MG tablet Take 1 tablet by mouth 2 times daily      PROLIA 60 MG/ML SOSY SC injection Inject 1 mL into the skin once for 1 dose (Patient taking differently: Inject 1 mL into the skin every 6 months) 1 Syringe 2    Dulaglutide (TRULICITY) 1.5 MG/0.5ML SOPN 0.5 mLs once a week (Patient not taking: Reported on 3/7/2024)      methocarbamol (ROBAXIN) 500 MG tablet Take 0.75 tablets by mouth 3 times daily as needed       Cranberry 400 MG TABS Take 400 mg by mouth daily      aspirin 81 MG EC tablet Take 1 tablet by mouth daily       Shift assessment done, VSS, A/O. Neuro checks WNL. Denies pain at this time. Up for all three meals. As tolerated. Ambulated to and from bathroom and back to bed. Voiding spontaneously, MD stated to hold AC until early this week with hgb down to 8.1. Anticipate discharge if doing well with voiding. Med's given per MAR. Standard safety measures in place. The care plan and education has been reviewed and mutually agreed upon with the patient.     Shift assessment done, VSS, A/O. Neuro checks WNL.Denies pain at this time. Meds given per MAR. Standard safety measures in place. The care plan and education has been reviewed and mutually agreed upon with the patient.     Urology Progress Note  [facility]    Provider: TOM Stone CNP  Patient ID:  Admission Date: 3/29/2024 Name: Isela Conner  OR Date: 3/29/2024 MRN: [id number]   Patient Location: [id number] : 1944  Attending: Rubi Sal MD Date of Service: 3/31/2024  PCP: Soha Tom APRN - NP     Diagnoses:  1. Urinary retention    2. Gross hematuria    3. Anticoagulated by anticoagulation treatment    4. Blood loss anemia         Assessment/Plan:  Diagnoses:  1. Urinary retention    2. Gross hematuria    3. Anticoagulated by anticoagulation treatment    4. Blood loss anemia          Assessment/Plan:  79 yo F with recent cystoscopy with botox with Dr. Monroe uneventful procedure. Developed GH with clot retention admitted through ER. Issa placed with significant relief of symptoms. CT with some clot in the bladder. Took eliquis this AM. Hgb 8.5 from 12.3 last month.     -Voiding spontaneously  -Will hold AC until early this week with hgb down to 8.1  -Anticipate discharge if PVR low and doing well with voiding  -Outpatient cystoscopy  -A message was left with my nurse to arrange f/u this coming week      The patient had a chance to ask questions which were answered. she understands the above plan.    Subjective:   Isela Conner is a 80 y.o. female. She was seen and examined this morning. Today we discussed outpt f/u.      Objective:   Vitals:  Vitals:    24 0830   BP: (!) 173/89   Pulse: (!) 106   Resp: 20   Temp: 98 °F (36.7 °C)   SpO2: 100%       Intake/Output Summary (Last 24 hours) at 3/31/2024 0857  Last data filed at 3/31/2024 0630  Gross per 24 hour   Intake 600 ml   Output 1650 ml   Net -1050 ml       Physical Exam:  Gen: Alert and oriented x3, no acute distress  CV: Regular rate   Resp: unlabored respirations  Abd: Soft, non-distended, non-tender, no masses  Ext: no peripheral edema noted, moves upper and lower extremities spontaneously  Skin: warmand well  Urology Progress Note  [facility]    Provider: TOM Stone CNP  Patient ID:  Admission Date: 3/29/2024 Name: Isela Conner  OR Date: 3/29/2024 MRN: [id number]   Patient Location: [id number] : 1944  Attending: Rubi Sal MD Date of Service: 3/30/2024  PCP: Soha Tom APRN - NP     Diagnoses:  1. Urinary retention    2. Gross hematuria    3. Anticoagulated by anticoagulation treatment    4. Blood loss anemia         Assessment/Plan:  Diagnoses:  1. Urinary retention    2. Gross hematuria    3. Anticoagulated by anticoagulation treatment    4. Blood loss anemia          Assessment/Plan:  81 yo F with recent cystoscopy with botox with Dr. Monroe uneventful procedure. Developed GH with clot retention admitted through ER. Sepulveda placed with significant relief of symptoms. CT with some clot in the bladder. Took eliquis this AM. Hgb 8.5 from 12.3 last month.     - sepulveda draining clear yellow/green urine  -Remove sepulveda tomorrow morning for a VT  -Start AC tomorrow  -Anticipate discharge tomorrow if PVR low and urine clear      The patient had a chance to ask questions which were answered. she understands the above plan.    Subjective:   Isela Conner is a 80 y.o. female. She was seen and examined this morning. Today we discussed outpt f/u.      Objective:   Vitals:  Vitals:    24 2347   BP:    Pulse:    Resp: 18   Temp:    SpO2:        Intake/Output Summary (Last 24 hours) at 3/30/2024 0909  Last data filed at 3/30/2024 0647  Gross per 24 hour   Intake --   Output 425 ml   Net -425 ml     Physical Exam:  Gen: Alert and oriented x3, no acute distress  CV: Regular rate   Resp: unlabored respirations  Abd: Soft, non-distended, non-tender, no masses  Ext: no peripheral edema noted, moves upper and lower extremities spontaneously  Skin: warmand well perfused, no rashes noted on the face, or arms.     Labs:  Lab Results   Component Value Date    WBC 7.8 2024    HGB  Therapeutic Interventions    Functional Outcomes  AM-PAC Inpatient Daily Activity Raw Score: 20                                    Cognition  WFL  Orientation:    alert and oriented x 4  Command Following:   WFL     Education  Barriers To Learning: none  Patient Education: patient educated on goals, OT role and benefits, plan of care, ADL adaptive strategies, discharge recommendations  Learning Assessment:  patient verbalizes understanding, would benefit from continued reinforcement    Assessment  Activity Tolerance: limited by generalized fatigue   Impairments Requiring Therapeutic Intervention: decreased functional mobility, decreased ADL status, decreased endurance  Prognosis: good  Clinical Assessment: Patient presents to hospital with blood in urine following procedure earlier in week. David SBA for short distance mobility and min A ADLs, usually mod I. Patient presents with the above deficits and would benefit from continued skilled OT to address return to PLOF.     Safety Interventions: patient left in chair, chair alarm in place, call light within reach, and nurse notified    Plan  Frequency: 3-5 x/per week  Current Treatment Recommendations: strengthening, balance training, functional mobility training, transfer training, patient/caregiver education, and ADL/self-care training    Goals  Patient Goals: to return home, to feel stronger   Short Term Goals:  Time Frame: discharge   Patient will complete upper body ADL at supervision   Patient will complete lower body ADL at supervision   Patient will complete toileting at supervision   Patient will complete grooming at supervision   Patient will complete functional transfers at supervision   Patient will complete functional mobility at supervision with RW     Above goals reviewed on 3/30/2024.  All goals are ongoing at this time unless indicated above.       Therapy Session Time     Individual Group Co-treatment   Time In    0755   Time Out    0885  Implemented All Universal Safety Interventions:  [place] to call system. Call bell, personal items and telephone within reach. Instruct patient to call for assistance. Room bathroom lighting operational. Non-slip footwear when patient is off stretcher. Physically safe environment: no spills, clutter or unnecessary equipment. Stretcher in lowest position, wheels locked, appropriate side rails in place.

## 2024-04-12 NOTE — ED PROVIDER NOTE - GENITOURINARY VULVA
71 year old female with diabetes, hypertension, hyperlipidemia, hypothyroidism, RA on Prednisone, fibromyalgia, cerebral aneurysm s/p repair, chronic hypercapnic respiratory failure s/p trach (vent dependent) and PEG, recurrent C.diff infection (follows with Dr. Newman) who presented with dislodged PEG.     #neuro  awake, interactive, follows commands    #resp  full vent support  monitor for hemoptysis - no katie hemoptysis at this time, continue duonebs   on VC/AC - normal pH     #Dislodged PEG  PEG again dislodged on 4/9, emerson in place. Appreciate IR input. GI and surgery consulted. Plan for joint intervention with GI and Surgery - closure and GJ tube placement.     #ID  Pt febrile to 102 on 4/7 and again febrile to 102 on 4/11. Blood cultures showing serratia 4/7. Repeat blood cultures from 4/9 pending. Switched to cefepime on 4/11 per ID recommendations. Repeat BCx on 4/11 pending, check CXR, lactate normal CT abd pelvis on 4/2 without source of infection, if continues to have fever, no clear source, will consider repeat CT abd/pelvis    Completed  fidaxomicin for total 10 day course, restart vanco po 125 mg qid as suppression as she is back on antibiotics.     #Renal   stable Cr  supplementing K, Mg    #ophthalmology   conjunctival erythema - no suspicion for endophthalmitis, start erythromycin gtt, artificial tears, they will monito normal

## 2024-08-11 ENCOUNTER — EMERGENCY (EMERGENCY)
Facility: HOSPITAL | Age: 34
LOS: 1 days | Discharge: HOME | End: 2024-08-11
Attending: EMERGENCY MEDICINE | Admitting: EMERGENCY MEDICINE
Payer: MEDICAID

## 2024-08-11 VITALS
RESPIRATION RATE: 16 BRPM | HEART RATE: 90 BPM | OXYGEN SATURATION: 99 % | DIASTOLIC BLOOD PRESSURE: 85 MMHG | WEIGHT: 205.03 LBS | TEMPERATURE: 99 F | SYSTOLIC BLOOD PRESSURE: 127 MMHG | HEIGHT: 69 IN

## 2024-08-11 VITALS
OXYGEN SATURATION: 100 % | RESPIRATION RATE: 18 BRPM | HEART RATE: 74 BPM | SYSTOLIC BLOOD PRESSURE: 152 MMHG | TEMPERATURE: 98 F | DIASTOLIC BLOOD PRESSURE: 95 MMHG

## 2024-08-11 DIAGNOSIS — Z90.89 ACQUIRED ABSENCE OF OTHER ORGANS: Chronic | ICD-10-CM

## 2024-08-11 PROCEDURE — 76642 ULTRASOUND BREAST LIMITED: CPT | Mod: 26,RT

## 2024-08-11 PROCEDURE — 10061 I&D ABSCESS COMP/MULTIPLE: CPT | Mod: RT

## 2024-08-11 PROCEDURE — 99284 EMERGENCY DEPT VISIT MOD MDM: CPT

## 2024-08-11 RX ORDER — ACETAMINOPHEN 500 MG
975 TABLET ORAL ONCE
Refills: 0 | Status: COMPLETED | OUTPATIENT
Start: 2024-08-11 | End: 2024-08-11

## 2024-08-11 RX ORDER — SULFAMETHOXAZOLE AND TRIMETHOPRIM 400; 80 MG/1; MG/1
1 TABLET ORAL ONCE
Refills: 0 | Status: COMPLETED | OUTPATIENT
Start: 2024-08-11 | End: 2024-08-11

## 2024-08-11 RX ADMIN — Medication 975 MILLIGRAM(S): at 20:57

## 2024-08-11 RX ADMIN — SULFAMETHOXAZOLE AND TRIMETHOPRIM 1 TABLET(S): 400; 80 TABLET ORAL at 21:33

## 2024-08-11 NOTE — ED PROVIDER NOTE - PROGRESS NOTE DETAILS
Kathrine Hinds PGY-1:  US showed more complex fluid collection in the area. will consult for surgery to take a look. surg to see. Kathrine Hinds PGY-1:  US showed more complex fluid collection in the area. will consult for surgery to take a look. surg to see.  pt states the Tylenol helped improve her pain. she is anxious about the results of the ultrasound. Emmett BECK: Pt signed out to me.  I&D performed at bedside by surgery - they recommend continuing augmentin x 7 days.  To follow-up with outpatient breast surgeon.  Pt reassessed and is feeling better. I independently reviewed the labs and/or imaging results, and there are no emergent findings.  Pt is stable for discharge and outpatient follow-up.

## 2024-08-11 NOTE — ED PROVIDER NOTE - NSFOLLOWUPINSTRUCTIONS_ED_ALL_ED_FT
You were seen in the emergency department for an abscess to your right breast.  The abscess was incised and drained.  Please continue to take AUGMENTIN 1 TABLET TWICE A DAY FOR 7 DAYS.  Follow-up with the breast surgeon Dr Sorenson (see information below, please call to schedule an appointment in 1 week).    Drink plenty of fluids.  You can take ibuprofen 600mg every 6 hours or Tylenol 650mg every 4 hours as needed for pain or fever.  Return to the emergency department for any new or worsening symptoms.

## 2024-08-11 NOTE — ED PROVIDER NOTE - ATTENDING CONTRIBUTION TO CARE
Dr. Guadarrama:  I have personally performed a face to face bedside history and physical examination of this patient. I have discussed the history, examination, review of systems, assessment and plan of management with the resident. I have reviewed the electronic medical record and amended it to reflect my history, review of systems, physical exam, assessment and plan.    34F h/o asthma presents with swelling/pain to right breast.  4 days ago, noticed what seemed like a "pimple", that subsequently "popped".  Since then, the area got more sore and swollen.  Denies fevers and other constitutional symptoms.    Exam:  - nad  - +1.5cm fluctuant raised boil appearing lesion with surrounding induration to 8 o'clock area of right breast  - no axillary lymphadenopathy    A/P  - eval for breast abscess  - US breast, abx

## 2024-08-11 NOTE — ED ADULT NURSE NOTE - NSFALLUNIVINTERV_ED_ALL_ED
Bed/Stretcher in lowest position, wheels locked, appropriate side rails in place/Call bell, personal items and telephone in reach/Instruct patient to call for assistance before getting out of bed/chair/stretcher/Non-slip footwear applied when patient is off stretcher/[place] to call system/Physically safe environment - no spills, clutter or unnecessary equipment/Purposeful proactive rounding/Room/bathroom lighting operational, light cord in reach

## 2024-08-11 NOTE — ED ADULT NURSE NOTE - OBJECTIVE STATEMENT
Pt a&ox4, ambulatory arrives c/o abscess under right breast x 2 days. Pmh asthma, uterine fibroids. On assessment pt well appearing. Respirations even and unlabored. Abscess to right breast noted and outlined. Pt medicated for pain per provider order. Family at bedside. Pt a&ox4, ambulatory arrives c/o abscess under right breast x 2 days. Pmh asthma, uterine fibroids. On assessment pt well appearing. Respirations even and unlabored. Abscess to right breast noted and outlined. Pt denies chest pain, shortness of breath, fevers, chills, nausea, vomiting. Pt medicated for pain per provider order. Family at bedside.

## 2024-08-11 NOTE — ED PROVIDER NOTE - PHYSICAL EXAMINATION
General: alert, oriented to person, time, place  Psych: mood appropriate  Head: normocephalic; atraumatic  Eyes: PERRLA, EOMI, conjunctivae clear bilaterally, sclerae anicteric  ENT: no nasal flaring, patent nares  Cardio: RRR, no m/r/g, pulses 2+ b/l  Resp: CATB, no w/r/r  Neuro: moving all four extremities equally  Skin: No evidence of rash or bruising, lesion on outer aspect of R breast- see below  MSK: normal movement of all extremities  Lymph/Vasc: no LE edema    breast exam performed with Dr. Romina Guadarrama present in the room. R breast showed 3cm x 3cm lesion with surrounding erythema. area of fluctuance surrounding the lesion. no drainage, no bleeding, area is tender to palpation.

## 2024-08-11 NOTE — ED PROVIDER NOTE - CLINICAL SUMMARY MEDICAL DECISION MAKING FREE TEXT BOX
34 year old female presenting with an abscess on the outer aspect of the R breast.  area of fluctuance surrounding the lesion. would like to get an US of the breast to see how deep it is. if this is more superficial most likely can treat with I&D in the ED. if this spreads farther will likely need surgery for removal. temp was 99.3 during triage. will give tylenol for pain and elevated temperature. treatment with antibiotics as well. 34 year old female presenting with an abscess on the outer aspect of the R breast.  area of fluctuance and erythema surrounding the lesion. would like to get an US of the breast to evaluate the depth and complexity of the abscess. if this is more superficial most likely can treat with I&D in the ED. if the abscess is more complex most likely will have surgery evaluate for input as this is an area of cosmetic importance. temp was 99.3 during triage. will give Tylenol for pain and elevated temperature. treatment with antibiotics as well.

## 2024-08-11 NOTE — ED PROVIDER NOTE - PATIENT PORTAL LINK FT
You can access the FollowMyHealth Patient Portal offered by [facility] by registering at the following website: [email or web address]. By joining [email or web address]’s FollowMyHealth portal, you will also be able to view your health information using other applications (apps) compatible with our system.

## 2024-08-11 NOTE — ED PROVIDER NOTE - CARE PROVIDER_API CALL
Mariela Sorenson)  Surgery  [address] [place] [id number]  Phone: [phone]  Fax: [phone]  Follow Up Time:

## 2024-08-11 NOTE — ED PROVIDER NOTE - OBJECTIVE STATEMENT
This is a 34 year old female presenting with This is a 34 year old female presenting with a sore on the side of her right breast. she noticed it 4 days ago and she thought it was just a pimple. over the past 3 days the sore got larger and more painful. yesterday she felt like it popped which relieved some of the pain and pressure for a short period of time. She was unsure what color the discharge was. she admits pain around the area and This is a 34 year old female PMH asthma presenting with a sore on the side of her right breast. she noticed it 4 days ago and she thought it was just a pimple. over the past 3 days the sore got larger and more painful. yesterday she felt like it popped which relieved some of the pain and pressure for a short period of time. She was unsure what color the discharge was. she went to urgent care and they recommended she come in for further evaluation. she has tried Advil Gel with only minimal relief. she last took aleve at 9am. she denies fevers/chills, nipple discharge, abdominal pain, nausea, vomiting, urinary symptoms. LMP was 7/28. This is a 34 year old female PMH asthma presenting with a sore on the side of her right breast. she noticed it 4 days ago and she thought it was just a pimple. over the past 3 days the sore got larger and more painful. yesterday she felt like it popped which relieved some of the pain and pressure for a short period of time. She was unsure what color the discharge was. she went to urgent care and they recommended she come in for further evaluation. she has tried Advil Gel with only minimal relief. she last took aleve at 9am. she denies fevers/chills, nipple discharge, abdominal pain, nausea, vomiting, urinary symptoms. LMP was 7/28. she is not actively breast feeding.

## 2024-08-12 LAB
GRAM STN FLD: SIGNIFICANT CHANGE UP
SPECIMEN SOURCE: SIGNIFICANT CHANGE UP

## 2024-08-12 RX ORDER — SULFAMETHOXAZOLE AND TRIMETHOPRIM 400; 80 MG/1; MG/1
1 TABLET ORAL
Qty: 20 | Refills: 0
Start: 2024-08-12 | End: 2024-08-21

## 2024-08-12 RX ADMIN — Medication 10 MILLILITER(S): at 00:28

## 2024-08-12 NOTE — CONSULT NOTE ADULT - SUBJECTIVE AND OBJECTIVE BOX
Ms. Carvajal is a 34 year old woman with PMHx asthma presenting with breast pain and swelling. Patient notes that she developed a pimple on the breast 4 days ago that grew larger and "popped" 2 days ago, and has been spontaneously draining pus. She has had persistent pain in the area. She denies fevers, chills, trauma to the area. She has never had breast surgery or biopsy in the past. She  her son for a short period of time 2 years ago. In the ED she is afebrile and hemodynamically normal. US demonstrates 4x3cm fluid collection in the breast.    PAST MEDICAL HISTORY: Fibroids    Ovarian cyst    Asthma        PAST SURGICAL HISTORY: No significant past surgical history    History of tonsillectomy        HOME MEDICATIONS:    ALLERGIES: No Known Allergies      FAMILY HISTORY:     SOCIAL HISTORY:    REVIEW OF SYSTEMS:    VITAL SIGNS:  ICU Vital Signs Last 24 Hrs  T(C): 36.8 (11 Aug 2024 20:59), Max: 37.4 (11 Aug 2024 19:00)  T(F): 98.3 (11 Aug 2024 20:59), Max: 99.3 (11 Aug 2024 19:00)  HR: 74 (11 Aug 2024 20:59) (74 - 90)  BP: 152/95 (11 Aug 2024 20:59) (127/85 - 152/95)  BP(mean): --  ABP: --  ABP(mean): --  RR: 18 (11 Aug 2024 20:59) (16 - 18)  SpO2: 100% (11 Aug 2024 20:59) (99% - 100%)    O2 Parameters below as of 11 Aug 2024 19:00  Patient On (Oxygen Delivery Method): room air            PHYSICAL EXAMINATION:  General - well-nourished, no acute distress  Neuro - awake, alert, oriented   Lungs - breathing comfortably on room air  Heart - regular rate and rhythm  Abdomen - soft, nontender, nondistended  Extremities - all four extremities are warm  Breast - right lateral breast with 2x2cm area of induration and erythema, focal area of fluctuance with spontaneous purulent drainage, tender to palpation    LABS:            IMAGING STUDIES:  < from: US Breast Limited, Right (08.11.24 @ 21:51) >    TECHNIQUE: Limited sonographic evaluation of the RIGHT breast was   performed targeted to the area of pain in the 8-9 o'clock region.   Evaluation was performed by a technologist. This study was performed   exclusively for the purpose of excluding the presence of abscess in the   clinical setting of mastitis.    FINDINGS:    There is a complex subcutaneous fluid collection measuring 4.2 x 1.1 x   3.3 cm in the area of concern with surrounding soft tissue edema and   hyperemia.    IMPRESSION:    A 4.2 cm complex fluid collection concerning for abscess.    --- End of Report ---      < end of copied text >

## 2024-08-12 NOTE — CONSULT NOTE ADULT - ASSESSMENT
Ms. Carvajal is a 34 year old woman with no pertinent PMHx presenting with a right breast abscess, s/p bedside I&D.    Recommendations:  - packing placed in incision, patient instructed to remove packing tomorrow  - please discharge on 1 week of Augmentin  - patient may follow up with Dr. Sorenson in one week    Dr. Mariela Sorenson  [address] [place] [address] [phone]    to be discussed with Dr. Sorenson    General Surgery  [phone] Ms. Carvajal is a 34 year old woman with no pertinent PMHx presenting with a right breast abscess, s/p bedside I&D.    Recommendations:  - packing placed in incision, patient instructed to remove packing tomorrow  - wound culture sent, follow up results  - please discharge on 1 week of Augmentin  - patient may follow up with Dr. Sorenson in one week    Dr. Mariela Sorenson  [address] [place], [place] [id number] [phone]    to be discussed with Dr. Sorenson    General Surgery  [phone]

## 2024-08-13 LAB — GRAM STN FLD: ABNORMAL

## 2024-08-13 NOTE — OB RN DELIVERY SUMMARY - NS_RESUSCITEFFORT_OBGYN_ALL_OB
Case Management Discharge Planning Note    Patient name Masoud Goregh  Location /-01 MRN [id number]  : 1954 Date 2024       Current Admission Date: 2024  Current Admission Diagnosis:Paroxysmal atrial fibrillation ([facility])   Patient Active Problem List    Diagnosis Date Noted Date Diagnosed    Lactic acidosis 2024     Hypercalcemia 2024     Chronic acquired lymphedema 2024     Hypotension 2024     Nocturnal hypoxia 2024     Elevated LFTs 2024     Patient on peritoneal dialysis ([facility]) 2024     Hypervolemia 2024     ESRD (end stage renal disease) ([facility]) 2024     Bacteremia 2024     Cellulitis of right lower extremity 2024     Paroxysmal atrial fibrillation ([facility]) 2024     Bilateral lower extremity edema 2023     Ureteral stone with hydronephrosis 2022     Cutaneous abscess of buttock 2022     Chronic kidney disease-mineral and bone disorder 2022     Type 2 diabetes mellitus with stage 4 chronic kidney disease, with long-term current use of insulin ([facility]) 07/15/2022     Obesity, morbid ([facility]) 07/15/2022     Secondary hyperparathyroidism of renal origin ([facility]) 07/15/2022     Stage 5 chronic kidney disease on peritoneal dialysis ([facility]) 07/15/2022     Factor 5 Leiden mutation, heterozygous ([facility]) 01/15/2022     Thrombocytopenia ([facility]) 2022     Angina at rest 2022     MI (myocardial infarction) ([facility]) 2022     History of PTCA 2022     Sleep apnea 2022     Smoking 2022     Anemia 2022     History of CVA (cerebrovascular accident) 2022     Acute on chronic diastolic HF (heart failure) ([facility]) 2022     HLD (hyperlipidemia) 2022     Lymphedema 2022     Hypothyroidism 2022       LOS (days): 1  Geometric Mean LOS (GMLOS) (days): 3.9  Days to GMLOS:2.8     OBJECTIVE:  Risk of Unplanned Readmission Score: 29.86         Current admission status:  Inpatient   Preferred Pharmacy:   [facility] - [place], [place] - [address]  [address]  [place] [address]  Phone: [phone] Fax: [phone]    Primary Care Provider: Jignesh Baker DO    Primary Insurance: [employer] REP  Secondary Insurance:     DISCHARGE DETAILS:     AVS was forward to [facility].                                                                                                                    Bulb Manuela-Pharynx Suction Only

## 2024-08-14 LAB
-  CLINDAMYCIN: SIGNIFICANT CHANGE UP
-  ERYTHROMYCIN: SIGNIFICANT CHANGE UP
-  GENTAMICIN: SIGNIFICANT CHANGE UP
-  OXACILLIN: SIGNIFICANT CHANGE UP
-  PENICILLIN: SIGNIFICANT CHANGE UP
-  RIFAMPIN: SIGNIFICANT CHANGE UP
-  TETRACYCLINE: SIGNIFICANT CHANGE UP
-  TRIMETHOPRIM/SULFAMETHOXAZOLE: SIGNIFICANT CHANGE UP
-  VANCOMYCIN: SIGNIFICANT CHANGE UP
METHOD TYPE: SIGNIFICANT CHANGE UP

## 2024-08-17 LAB
CULTURE RESULTS: ABNORMAL
ORGANISM # SPEC MICROSCOPIC CNT: ABNORMAL
ORGANISM # SPEC MICROSCOPIC CNT: ABNORMAL
SPECIMEN SOURCE: SIGNIFICANT CHANGE UP

## 2024-08-18 RX ORDER — SULFAMETHOXAZOLE AND TRIMETHOPRIM 400; 80 MG/1; MG/1
1 TABLET ORAL
Qty: 14 | Refills: 0
Start: 2024-08-18 | End: 2024-08-24

## 2024-08-19 ENCOUNTER — EMERGENCY (EMERGENCY)
Facility: HOSPITAL | Age: 34
LOS: 1 days | Discharge: HOME | End: 2024-08-19
Attending: EMERGENCY MEDICINE | Admitting: EMERGENCY MEDICINE
Payer: MEDICAID

## 2024-08-19 VITALS
HEIGHT: 69 IN | SYSTOLIC BLOOD PRESSURE: 157 MMHG | TEMPERATURE: 98 F | RESPIRATION RATE: 18 BRPM | OXYGEN SATURATION: 99 % | DIASTOLIC BLOOD PRESSURE: 66 MMHG | WEIGHT: 205.03 LBS | HEART RATE: 82 BPM

## 2024-08-19 DIAGNOSIS — Z90.89 ACQUIRED ABSENCE OF OTHER ORGANS: Chronic | ICD-10-CM

## 2024-08-19 PROCEDURE — 93010 ELECTROCARDIOGRAM REPORT: CPT

## 2024-08-19 PROCEDURE — 99285 EMERGENCY DEPT VISIT HI MDM: CPT

## 2024-08-20 LAB
A1C WITH ESTIMATED AVERAGE GLUCOSE RESULT: 7 % — HIGH (ref 4–5.6)
ADD ON TEST-SPECIMEN IN LAB: SIGNIFICANT CHANGE UP
ALBUMIN SERPL ELPH-MCNC: 3.7 G/DL — SIGNIFICANT CHANGE UP (ref 3.3–5)
ALP SERPL-CCNC: 96 U/L — SIGNIFICANT CHANGE UP (ref 40–120)
ALT FLD-CCNC: 19 U/L — SIGNIFICANT CHANGE UP (ref 4–33)
ANION GAP SERPL CALC-SCNC: 16 MMOL/L — HIGH (ref 7–14)
AST SERPL-CCNC: 32 U/L — SIGNIFICANT CHANGE UP (ref 4–32)
BASOPHILS # BLD AUTO: 0.06 K/UL — SIGNIFICANT CHANGE UP (ref 0–0.2)
BASOPHILS NFR BLD AUTO: 0.5 % — SIGNIFICANT CHANGE UP (ref 0–2)
BILIRUB SERPL-MCNC: 0.2 MG/DL — SIGNIFICANT CHANGE UP (ref 0.2–1.2)
BUN SERPL-MCNC: 12 MG/DL — SIGNIFICANT CHANGE UP (ref 7–23)
CALCIUM SERPL-MCNC: 9.1 MG/DL — SIGNIFICANT CHANGE UP (ref 8.4–10.5)
CHLORIDE SERPL-SCNC: 101 MMOL/L — SIGNIFICANT CHANGE UP (ref 98–107)
CO2 SERPL-SCNC: 15 MMOL/L — LOW (ref 22–31)
CREAT SERPL-MCNC: 0.68 MG/DL — SIGNIFICANT CHANGE UP (ref 0.5–1.3)
EGFR: 116 ML/MIN/1.73M2 — SIGNIFICANT CHANGE UP
EOSINOPHIL # BLD AUTO: 0.01 K/UL — SIGNIFICANT CHANGE UP (ref 0–0.5)
EOSINOPHIL NFR BLD AUTO: 0.1 % — SIGNIFICANT CHANGE UP (ref 0–6)
ESTIMATED AVERAGE GLUCOSE: 154 — SIGNIFICANT CHANGE UP
FLUAV AG NPH QL: SIGNIFICANT CHANGE UP
FLUBV AG NPH QL: SIGNIFICANT CHANGE UP
GLUCOSE SERPL-MCNC: 287 MG/DL — HIGH (ref 70–99)
HCG SERPL-ACNC: <1 MIU/ML — SIGNIFICANT CHANGE UP
HCT VFR BLD CALC: 31.7 % — LOW (ref 34.5–45)
HGB BLD-MCNC: 9.2 G/DL — LOW (ref 11.5–15.5)
IANC: 11.77 K/UL — HIGH (ref 1.8–7.4)
IMM GRANULOCYTES NFR BLD AUTO: 0.9 % — SIGNIFICANT CHANGE UP (ref 0–0.9)
LYMPHOCYTES # BLD AUTO: 0.78 K/UL — LOW (ref 1–3.3)
LYMPHOCYTES # BLD AUTO: 6.1 % — LOW (ref 13–44)
MCHC RBC-ENTMCNC: 20 PG — LOW (ref 27–34)
MCHC RBC-ENTMCNC: 29 GM/DL — LOW (ref 32–36)
MCV RBC AUTO: 69.1 FL — LOW (ref 80–100)
MONOCYTES # BLD AUTO: 0.08 K/UL — SIGNIFICANT CHANGE UP (ref 0–0.9)
MONOCYTES NFR BLD AUTO: 0.6 % — LOW (ref 2–14)
NEUTROPHILS # BLD AUTO: 11.77 K/UL — HIGH (ref 1.8–7.4)
NEUTROPHILS NFR BLD AUTO: 91.8 % — HIGH (ref 43–77)
NRBC # BLD: 0 /100 WBCS — SIGNIFICANT CHANGE UP (ref 0–0)
NRBC # FLD: 0 K/UL — SIGNIFICANT CHANGE UP (ref 0–0)
NT-PROBNP SERPL-SCNC: <36 PG/ML — SIGNIFICANT CHANGE UP
PLATELET # BLD AUTO: 400 K/UL — SIGNIFICANT CHANGE UP (ref 150–400)
POTASSIUM SERPL-MCNC: 5.2 MMOL/L — SIGNIFICANT CHANGE UP (ref 3.5–5.3)
POTASSIUM SERPL-SCNC: 5.2 MMOL/L — SIGNIFICANT CHANGE UP (ref 3.5–5.3)
PROT SERPL-MCNC: 8.1 G/DL — SIGNIFICANT CHANGE UP (ref 6–8.3)
RBC # BLD: 4.59 M/UL — SIGNIFICANT CHANGE UP (ref 3.8–5.2)
RBC # FLD: 19.6 % — HIGH (ref 10.3–14.5)
RSV RNA NPH QL NAA+NON-PROBE: SIGNIFICANT CHANGE UP
SARS-COV-2 RNA SPEC QL NAA+PROBE: SIGNIFICANT CHANGE UP
SODIUM SERPL-SCNC: 132 MMOL/L — LOW (ref 135–145)
TROPONIN T, HIGH SENSITIVITY RESULT: <6 NG/L — SIGNIFICANT CHANGE UP
WBC # BLD: 12.82 K/UL — HIGH (ref 3.8–10.5)
WBC # FLD AUTO: 12.82 K/UL — HIGH (ref 3.8–10.5)

## 2024-08-20 PROCEDURE — 71046 X-RAY EXAM CHEST 2 VIEWS: CPT | Mod: 26

## 2024-08-20 RX ORDER — PREDNISONE 10 MG/1
1 TABLET ORAL
Qty: 6 | Refills: 0
Start: 2024-08-20 | End: 2024-08-25

## 2024-08-20 RX ORDER — IPRATROPIUM BROMIDE AND ALBUTEROL SULFATE 2.5; .5 MG/3ML; MG/3ML
3 SOLUTION RESPIRATORY (INHALATION) ONCE
Refills: 0 | Status: COMPLETED | OUTPATIENT
Start: 2024-08-20 | End: 2024-08-20

## 2024-08-20 RX ADMIN — IPRATROPIUM BROMIDE AND ALBUTEROL SULFATE 3 MILLILITER(S): 2.5; .5 SOLUTION RESPIRATORY (INHALATION) at 01:29

## 2024-08-20 NOTE — ED PROVIDER NOTE - PATIENT PORTAL LINK FT
You can access the FollowMyHealth Patient Portal offered by [facility] by registering at the following website: [email or web address]. By joining [facility]’s FollowMyHealth portal, you will also be able to view your health information using other applications (apps) compatible with our system.

## 2024-08-20 NOTE — ED ADULT NURSE NOTE - OBJECTIVE STATEMENT
Pt received to intake 10C, A&Ox4. Pt endorsing chest tightness, SOB x 3 days, no relief from home inhaler. Hx of asthma. Pt denies fevers, chills, headache, dizziness, N/V/D. respirations even and unlabored. 22G IV established to right hand, labs drawn. safety maintained, call bell in reach

## 2024-08-20 NOTE — ED PROVIDER NOTE - NSFOLLOWUPINSTRUCTIONS_ED_ALL_ED_FT
You were evaluated in the Emergency Department today for chest pain. Your evaluation has shown no signs of medical conditions requiring emergent intervention at this time, however we recommend that you follow up with your primary care physician or your cardiologist as soon as possible for further testing as an outpatient.    Please schedule an appointment for follow up with your primary care physician as soon as possible.    Return to the Emergency Department if you experience worsening or uncontrolled chest pain, shortness of breath, lightheadedness, feeling faint, nausea, vomiting, or any other concerning symptoms.    Thank you for choosing us for your care.

## 2024-08-20 NOTE — ED PROVIDER NOTE - PROGRESS NOTE DETAILS
patient feels well, asking to d/c. No lab results requiring emergent intervention. trop negative, ekg nsr no ischemic changes, patient told about elevated glucose.

## 2024-08-20 NOTE — ED PROVIDER NOTE - PHYSICAL EXAMINATION
General: Alert and Orientated x 3. No apparent distress.  Head: Normocephalic and atraumatic.  Eyes: PERRLA with EOMI.   ENT: MMM  Neck: Supple.  Cardiac: Normal S1 and S2 w/ RRR. No murmurs appreciated.   Pulmonary: CTA bilaterally. No increased WOB.   Abdominal: Soft, non-tender, non-distended  Neurologic: No focal sensory or motor deficits.  Extremities: No lower extremity edema, bruising, soreness   Skin: Color appropriate for race. Intact, warm, and well-perfused.  Psychiatric: Appropriate mood and affect. No apparent risk to self or others.

## 2024-08-20 NOTE — ED PROVIDER NOTE - ATTENDING CONTRIBUTION TO CARE
35 y/o female hx asthma presents with 2 days of chest tightness, cough, shortness of breath. She has been using her albuterol inhaler ever few hours and it helps for a brief period and then symptoms return. Denies fever/chills, abdominal pain, n/v/d, leg swelling, history of blood clots. She does not take OCPs, lmp around one month ago  symptomatic improvement with med. d-dimer neg. labs wnl, cxr neg for pna, pneumothorax

## 2024-08-20 NOTE — ED POST DISCHARGE NOTE - OTHER COMMUNICATION
AUSTYN AGARWAL: pt called yesterday, states she was supposed to get steroids in the pharmacy; chart reviewed, patient given albuterol but no order for steroids, no prescription ordered in [place]. Pt's glucose noted to be high in 200's , Ha1c 7. no discharge instructions for steroids use. Discussed with patient, that steroids will make her sugar worse, unless she has severe asthma attack which requires steroids. pt states albuterol inhaler is helping. pt verbalized understanding, will return to ED if worsening symptoms.

## 2024-08-20 NOTE — ED PROVIDER NOTE - OBJECTIVE STATEMENT
Follow up with your PCP and wound care  Return if you develop symptoms concerning for infection 33 y/o female hx asthma presents with 2 days of chest tightness, cough, shortness of breath. She has been using her albuterol inhaler ever few hours and it helps for a brief period and then symptoms return. Denies fever/chills, abdominal pain, n/v/d, leg swelling, history of blood clots. She does not take OCPs, lmp around one month ago.

## 2024-08-20 NOTE — ED PROVIDER NOTE - CLINICAL SUMMARY MEDICAL DECISION MAKING FREE TEXT BOX
33 y/o female hx asthma presents with 2 days of chest tightness, cough, shortness of breath. She is hemodynamically stable, afebrile, on exam she has reduced air movement in lungs without wheezing, increased WOB. No lower extremity swelling, erythema, tenderness. Symptoms likely related to asthma vs URI vs pleuritis, given persistence of symptoms despite albuterol, lack of wheezing can't rule out PE, will get D-Dimer, labs, cxr, nebulizer treatment, reassess.

## 2024-12-05 NOTE — OB PROVIDER TRIAGE NOTE - NSHPLABSRESULTS_GEN_ALL_CORE
yes...
Urinalysis Basic - ( 02 Oct 2022 06:45 )    Color: Yellow / Appearance: Slightly Turbid / S.036 / pH: x  Gluc: x / Ketone: Small  / Bili: Negative / Urobili: <2 mg/dL   Blood: x / Protein: 30 mg/dL / Nitrite: Negative   Leuk Esterase: Negative / RBC: 7 /HPF / WBC 9 /HPF   Sq Epi: x / Non Sq Epi: 15 /HPF / Bacteria: Moderate

## 2024-12-12 NOTE — OB RN TRIAGE NOTE - NS_VISITREASON1_OBGYN_ALL_OB
[de-identified] : Pt c/o oral blisters on and off for a couple months. They come and go. They can hurt. She did have hand foot and mouth disease a few months.   Periods regular. LMP = 3 weeks ago.   Her daughter is 19 months old now. Pt's baseline is back to normal now.   She exercises regulalry.   AFV, FHR, BPD Abnormalities

## 2025-09-03 ENCOUNTER — EMERGENCY (EMERGENCY)
Facility: HOSPITAL | Age: 35
LOS: 1 days | End: 2025-09-03
Attending: STUDENT IN AN ORGANIZED HEALTH CARE EDUCATION/TRAINING PROGRAM | Admitting: STUDENT IN AN ORGANIZED HEALTH CARE EDUCATION/TRAINING PROGRAM
Payer: MEDICAID

## 2025-09-03 VITALS
SYSTOLIC BLOOD PRESSURE: 165 MMHG | HEART RATE: 79 BPM | TEMPERATURE: 98 F | DIASTOLIC BLOOD PRESSURE: 80 MMHG | RESPIRATION RATE: 18 BRPM | OXYGEN SATURATION: 100 %

## 2025-09-03 VITALS
RESPIRATION RATE: 18 BRPM | OXYGEN SATURATION: 100 % | SYSTOLIC BLOOD PRESSURE: 144 MMHG | HEART RATE: 78 BPM | DIASTOLIC BLOOD PRESSURE: 83 MMHG

## 2025-09-03 DIAGNOSIS — Z90.89 ACQUIRED ABSENCE OF OTHER ORGANS: Chronic | ICD-10-CM

## 2025-09-03 LAB
ALBUMIN SERPL ELPH-MCNC: 3.4 G/DL — SIGNIFICANT CHANGE UP (ref 3.3–5)
ALP SERPL-CCNC: 79 U/L — SIGNIFICANT CHANGE UP (ref 40–120)
ALT FLD-CCNC: 19 U/L — SIGNIFICANT CHANGE UP (ref 4–33)
ANION GAP SERPL CALC-SCNC: 12 MMOL/L — SIGNIFICANT CHANGE UP (ref 7–14)
ANISOCYTOSIS BLD QL: ABNORMAL
AST SERPL-CCNC: 16 U/L — SIGNIFICANT CHANGE UP (ref 4–32)
BASOPHILS # BLD AUTO: 0.02 K/UL — SIGNIFICANT CHANGE UP (ref 0–0.2)
BASOPHILS # BLD AUTO: 0.04 K/UL — SIGNIFICANT CHANGE UP (ref 0–0.2)
BASOPHILS # BLD MANUAL: 0 K/UL — SIGNIFICANT CHANGE UP (ref 0–0.2)
BASOPHILS NFR BLD AUTO: 0.2 % — SIGNIFICANT CHANGE UP (ref 0–2)
BASOPHILS NFR BLD AUTO: 0.4 % — SIGNIFICANT CHANGE UP (ref 0–2)
BASOPHILS NFR BLD MANUAL: 0 % — SIGNIFICANT CHANGE UP (ref 0–2)
BILIRUB SERPL-MCNC: 0.2 MG/DL — SIGNIFICANT CHANGE UP (ref 0.2–1.2)
BLD GP AB SCN SERPL QL: NEGATIVE — SIGNIFICANT CHANGE UP
BLOOD GAS VENOUS COMPREHENSIVE RESULT: SIGNIFICANT CHANGE UP
BUN SERPL-MCNC: 14 MG/DL — SIGNIFICANT CHANGE UP (ref 7–23)
BURR CELLS BLD QL SMEAR: SLIGHT — SIGNIFICANT CHANGE UP
CALCIUM SERPL-MCNC: 8.9 MG/DL — SIGNIFICANT CHANGE UP (ref 8.4–10.5)
CHLORIDE SERPL-SCNC: 101 MMOL/L — SIGNIFICANT CHANGE UP (ref 98–107)
CO2 SERPL-SCNC: 22 MMOL/L — SIGNIFICANT CHANGE UP (ref 22–31)
CREAT SERPL-MCNC: 1.04 MG/DL — SIGNIFICANT CHANGE UP (ref 0.5–1.3)
DACRYOCYTES BLD QL SMEAR: SLIGHT — SIGNIFICANT CHANGE UP
EGFR: 71 ML/MIN/1.73M2 — SIGNIFICANT CHANGE UP
EGFR: 71 ML/MIN/1.73M2 — SIGNIFICANT CHANGE UP
EOSINOPHIL # BLD AUTO: 0 K/UL — SIGNIFICANT CHANGE UP (ref 0–0.5)
EOSINOPHIL # BLD AUTO: 0.07 K/UL — SIGNIFICANT CHANGE UP (ref 0–0.5)
EOSINOPHIL # BLD MANUAL: 0 K/UL — SIGNIFICANT CHANGE UP (ref 0–0.5)
EOSINOPHIL NFR BLD AUTO: 0 % — SIGNIFICANT CHANGE UP (ref 0–6)
EOSINOPHIL NFR BLD AUTO: 0.7 % — SIGNIFICANT CHANGE UP (ref 0–6)
EOSINOPHIL NFR BLD MANUAL: 0 % — SIGNIFICANT CHANGE UP (ref 0–6)
FERRITIN SERPL-MCNC: 6 NG/ML — LOW (ref 15–150)
FLUAV AG NPH QL: SIGNIFICANT CHANGE UP
FLUBV AG NPH QL: SIGNIFICANT CHANGE UP
GIANT PLATELETS BLD QL SMEAR: PRESENT
GLUCOSE SERPL-MCNC: 258 MG/DL — HIGH (ref 70–99)
HCG SERPL-ACNC: <1 MIU/ML — SIGNIFICANT CHANGE UP
HCT VFR BLD CALC: 24.6 % — LOW (ref 34.5–45)
HCT VFR BLD CALC: 28.4 % — LOW (ref 34.5–45)
HGB BLD-MCNC: 6.7 G/DL — CRITICAL LOW (ref 11.5–15.5)
HGB BLD-MCNC: 8 G/DL — LOW (ref 11.5–15.5)
HYPOCHROMIA BLD QL: ABNORMAL
IMM GRANULOCYTES # BLD AUTO: 0.05 K/UL — SIGNIFICANT CHANGE UP (ref 0–0.07)
IMM GRANULOCYTES # BLD AUTO: 0.15 K/UL — HIGH (ref 0–0.07)
IMM GRANULOCYTES NFR BLD AUTO: 0.5 % — SIGNIFICANT CHANGE UP (ref 0–0.9)
IMM GRANULOCYTES NFR BLD AUTO: 1.2 % — HIGH (ref 0–0.9)
IRON SATN MFR SERPL: 12 UG/DL — LOW (ref 30–160)
IRON SATN MFR SERPL: 3 % — LOW (ref 14–50)
LYMPHOCYTES # BLD AUTO: 0.73 K/UL — LOW (ref 1–3.3)
LYMPHOCYTES # BLD AUTO: 1.2 K/UL — SIGNIFICANT CHANGE UP (ref 1–3.3)
LYMPHOCYTES # BLD MANUAL: 0.85 K/UL — LOW (ref 1–3.3)
LYMPHOCYTES NFR BLD AUTO: 12.3 % — LOW (ref 13–44)
LYMPHOCYTES NFR BLD AUTO: 6 % — LOW (ref 13–44)
LYMPHOCYTES NFR BLD MANUAL: 8.7 % — LOW (ref 13–44)
MCHC RBC-ENTMCNC: 17.8 PG — LOW (ref 27–34)
MCHC RBC-ENTMCNC: 18.6 PG — LOW (ref 27–34)
MCHC RBC-ENTMCNC: 27.2 G/DL — LOW (ref 32–36)
MCHC RBC-ENTMCNC: 28.2 G/DL — LOW (ref 32–36)
MCV RBC AUTO: 65.4 FL — LOW (ref 80–100)
MCV RBC AUTO: 65.9 FL — LOW (ref 80–100)
MICROCYTES BLD QL: ABNORMAL
MONOCYTES # BLD AUTO: 0.04 K/UL — SIGNIFICANT CHANGE UP (ref 0–0.9)
MONOCYTES # BLD AUTO: 0.15 K/UL — SIGNIFICANT CHANGE UP (ref 0–0.9)
MONOCYTES # BLD MANUAL: 0.09 K/UL — SIGNIFICANT CHANGE UP (ref 0–0.9)
MONOCYTES NFR BLD AUTO: 0.3 % — LOW (ref 2–14)
MONOCYTES NFR BLD AUTO: 1.5 % — LOW (ref 2–14)
MONOCYTES NFR BLD MANUAL: 0.9 % — LOW (ref 2–14)
NEUTROPHILS # BLD AUTO: 11.32 K/UL — HIGH (ref 1.8–7.4)
NEUTROPHILS # BLD AUTO: 8.23 K/UL — HIGH (ref 1.8–7.4)
NEUTROPHILS # BLD MANUAL: 8.8 K/UL — HIGH (ref 1.8–7.4)
NEUTROPHILS NFR BLD AUTO: 84.6 % — HIGH (ref 43–77)
NEUTROPHILS NFR BLD AUTO: 92.3 % — HIGH (ref 43–77)
NEUTROPHILS NFR BLD MANUAL: 90.4 % — HIGH (ref 43–77)
NRBC # BLD AUTO: 0 K/UL — SIGNIFICANT CHANGE UP (ref 0–0)
NRBC # BLD AUTO: 0.02 K/UL — HIGH (ref 0–0)
NRBC # FLD: 0 K/UL — SIGNIFICANT CHANGE UP (ref 0–0)
NRBC # FLD: 0.02 K/UL — HIGH (ref 0–0)
NRBC BLD AUTO-RTO: 0 /100 WBCS — SIGNIFICANT CHANGE UP (ref 0–0)
NRBC BLD AUTO-RTO: 0 /100 WBCS — SIGNIFICANT CHANGE UP (ref 0–0)
OVALOCYTES BLD QL SMEAR: ABNORMAL
PLAT MORPH BLD: ABNORMAL
PLATELET # BLD AUTO: 338 K/UL — SIGNIFICANT CHANGE UP (ref 150–400)
PLATELET # BLD AUTO: 367 K/UL — SIGNIFICANT CHANGE UP (ref 150–400)
PLATELET COUNT - ESTIMATE: NORMAL — SIGNIFICANT CHANGE UP
PMV BLD: 9.2 FL — SIGNIFICANT CHANGE UP (ref 7–13)
PMV BLD: 9.3 FL — SIGNIFICANT CHANGE UP (ref 7–13)
POIKILOCYTOSIS BLD QL AUTO: ABNORMAL
POLYCHROMASIA BLD QL SMEAR: SLIGHT — SIGNIFICANT CHANGE UP
POTASSIUM SERPL-MCNC: 3.7 MMOL/L — SIGNIFICANT CHANGE UP (ref 3.5–5.3)
POTASSIUM SERPL-SCNC: 3.7 MMOL/L — SIGNIFICANT CHANGE UP (ref 3.5–5.3)
PROT SERPL-MCNC: 6.8 G/DL — SIGNIFICANT CHANGE UP (ref 6–8.3)
RBC # BLD: 3.76 M/UL — LOW (ref 3.8–5.2)
RBC # BLD: 4.31 M/UL — SIGNIFICANT CHANGE UP (ref 3.8–5.2)
RBC # FLD: 19.5 % — HIGH (ref 10.3–14.5)
RBC # FLD: 20.9 % — HIGH (ref 10.3–14.5)
RBC BLD AUTO: ABNORMAL
RH IG SCN BLD-IMP: POSITIVE — SIGNIFICANT CHANGE UP
RSV RNA NPH QL NAA+NON-PROBE: SIGNIFICANT CHANGE UP
SARS-COV-2 RNA SPEC QL NAA+PROBE: SIGNIFICANT CHANGE UP
SMUDGE CELLS # BLD: PRESENT
SODIUM SERPL-SCNC: 135 MMOL/L — SIGNIFICANT CHANGE UP (ref 135–145)
SOURCE RESPIRATORY: SIGNIFICANT CHANGE UP
TARGETS BLD QL SMEAR: SLIGHT — SIGNIFICANT CHANGE UP
TIBC SERPL-MCNC: 389 UG/DL — SIGNIFICANT CHANGE UP (ref 220–430)
TRANSFERRIN SERPL-MCNC: 311 MG/DL — SIGNIFICANT CHANGE UP (ref 200–360)
UIBC SERPL-MCNC: 377 UG/DL — HIGH (ref 110–370)
WBC # BLD: 12.26 K/UL — HIGH (ref 3.8–10.5)
WBC # BLD: 9.74 K/UL — SIGNIFICANT CHANGE UP (ref 3.8–10.5)
WBC # FLD AUTO: 12.26 K/UL — HIGH (ref 3.8–10.5)
WBC # FLD AUTO: 9.74 K/UL — SIGNIFICANT CHANGE UP (ref 3.8–10.5)

## 2025-09-03 PROCEDURE — 71046 X-RAY EXAM CHEST 2 VIEWS: CPT | Mod: 26

## 2025-09-03 PROCEDURE — 93010 ELECTROCARDIOGRAM REPORT: CPT

## 2025-09-03 PROCEDURE — 99285 EMERGENCY DEPT VISIT HI MDM: CPT

## 2025-09-03 RX ORDER — IPRATROPIUM BROMIDE AND ALBUTEROL SULFATE .5; 2.5 MG/3ML; MG/3ML
3 SOLUTION RESPIRATORY (INHALATION) ONCE
Refills: 0 | Status: COMPLETED | OUTPATIENT
Start: 2025-09-03 | End: 2025-09-03

## 2025-09-03 RX ORDER — METHYLPREDNISOLONE ACETATE 80 MG/ML
125 INJECTION, SUSPENSION INTRA-ARTICULAR; INTRALESIONAL; INTRAMUSCULAR; SOFT TISSUE ONCE
Refills: 0 | Status: COMPLETED | OUTPATIENT
Start: 2025-09-03 | End: 2025-09-03

## 2025-09-03 RX ORDER — MAGNESIUM SULFATE 500 MG/ML
2 SYRINGE (ML) INJECTION ONCE
Refills: 0 | Status: COMPLETED | OUTPATIENT
Start: 2025-09-03 | End: 2025-09-03

## 2025-09-03 RX ADMIN — METHYLPREDNISOLONE ACETATE 125 MILLIGRAM(S): 80 INJECTION, SUSPENSION INTRA-ARTICULAR; INTRALESIONAL; INTRAMUSCULAR; SOFT TISSUE at 01:24

## 2025-09-03 RX ADMIN — Medication 1000 MILLILITER(S): at 01:26

## 2025-09-03 RX ADMIN — Medication 150 GRAM(S): at 01:27

## 2025-09-03 RX ADMIN — IPRATROPIUM BROMIDE AND ALBUTEROL SULFATE 3 MILLILITER(S): .5; 2.5 SOLUTION RESPIRATORY (INHALATION) at 01:04
